# Patient Record
Sex: MALE | Race: WHITE | Employment: FULL TIME | URBAN - METROPOLITAN AREA
[De-identification: names, ages, dates, MRNs, and addresses within clinical notes are randomized per-mention and may not be internally consistent; named-entity substitution may affect disease eponyms.]

---

## 2017-05-31 ENCOUNTER — TELEPHONE (OUTPATIENT)
Dept: FAMILY MEDICINE CLINIC | Age: 42
End: 2017-05-31

## 2017-10-08 ENCOUNTER — HOSPITAL ENCOUNTER (EMERGENCY)
Age: 42
Discharge: HOME | End: 2017-10-08
Attending: EMERGENCY MEDICINE
Payer: COMMERCIAL

## 2017-10-08 VITALS
HEART RATE: 59 BPM | RESPIRATION RATE: 12 BRPM | TEMPERATURE: 98.5 F | OXYGEN SATURATION: 96 % | DIASTOLIC BLOOD PRESSURE: 99 MMHG | SYSTOLIC BLOOD PRESSURE: 156 MMHG

## 2017-10-08 DIAGNOSIS — L50.9 URTICARIA: ICD-10-CM

## 2017-10-08 DIAGNOSIS — R03.0 TRANSIENT ELEVATED BLOOD PRESSURE: ICD-10-CM

## 2017-10-08 DIAGNOSIS — L30.9 DERMATITIS: Primary | ICD-10-CM

## 2017-10-08 PROCEDURE — 99282 EMERGENCY DEPT VISIT SF MDM: CPT

## 2017-10-08 RX ORDER — HYDROXYZINE HYDROCHLORIDE 25 MG/1
50 TABLET, FILM COATED ORAL EVERY 4 HOURS PRN
Qty: 30 TABLET | Refills: 0 | Status: SHIPPED | OUTPATIENT
Start: 2017-10-08 | End: 2017-10-18

## 2017-10-08 RX ORDER — TRIAMCINOLONE ACETONIDE OINTMENT USP, 0.05% 0.5 MG/G
OINTMENT TOPICAL 2 TIMES DAILY
Qty: 30 G | Refills: 0 | Status: SHIPPED | OUTPATIENT
Start: 2017-10-08 | End: 2018-02-11

## 2017-10-08 ASSESSMENT — ENCOUNTER SYMPTOMS
RHINORRHEA: 0
EYE ITCHING: 0
VOMITING: 0
NAUSEA: 0
ABDOMINAL PAIN: 0
EYE DISCHARGE: 0
WHEEZING: 0
SHORTNESS OF BREATH: 0
DIARRHEA: 0
ABDOMINAL DISTENTION: 0
COUGH: 0

## 2017-10-08 ASSESSMENT — PAIN DESCRIPTION - LOCATION: LOCATION: ARM;BACK

## 2017-10-08 ASSESSMENT — PAIN DESCRIPTION - FREQUENCY: FREQUENCY: INTERMITTENT

## 2017-10-08 ASSESSMENT — PAIN DESCRIPTION - PROGRESSION: CLINICAL_PROGRESSION: GRADUALLY WORSENING

## 2017-10-08 ASSESSMENT — PAIN SCALES - GENERAL: PAINLEVEL_OUTOF10: 9

## 2017-10-08 ASSESSMENT — PAIN DESCRIPTION - DESCRIPTORS: DESCRIPTORS: BURNING;ITCHING

## 2017-10-08 ASSESSMENT — PAIN DESCRIPTION - ONSET: ONSET: ON-GOING

## 2017-10-08 ASSESSMENT — PAIN DESCRIPTION - ORIENTATION: ORIENTATION: RIGHT;LEFT;LOWER

## 2017-10-08 NOTE — ED PROVIDER NOTES
Ultrasound and MRI are read by the radiologist.  None  [] Visualized and interpreted by me   [] Radiologist's Wet Read Report Reviewed   [] Discussed with Radiologist.    LABS:   Labs Reviewed - No data to display    EMERGENCY DEPARTMENT COURSE:   Vitals:    Vitals:    10/08/17 0028   BP: (!) 156/99   Pulse: 59   Resp: 12   Temp: 98.5 °F (36.9 °C)   SpO2: 96%       1:54 AM     Patient was seen and evaluated in a timely fashion. His rashes from lower back and right elbow are chronic which can be chronic urticaria or atopic dermatitis. His rashes from left forearm is consistent with acute urticaria. I recommend use hydroxyzine and topical steroid. He is prescribed with hydroxyzine and triamcinolone cream. Followed by [facility] in one week. BP is elevated which should be rechecked by PCP during follow up visit. CRITICAL CARE:   None    CONSULTS:  None    PROCEDURES:  None    FINAL IMPRESSION      1. Dermatitis    2. Urticaria    3.  Transient elevated blood pressure          DISPOSITION/PLAN   Home    PATIENT REFERRED TO:  [facility]  [address]  [phone]  In 1 week        DISCHARGE MEDICATIONS:  New Prescriptions    HYDROXYZINE (ATARAX) 25 MG TABLET    Take 2 tablets by mouth every 4 hours as needed for Itching    TRIAMCINOLONE (KENALOG) 0.05 % OINT OINTMENT    Apply topically 2 times daily       (Please note that portions of this note were completed with a voice recognition program.  Efforts were made to edit the dictations but occasionally words are mis-transcribed.)    MD Susie Myrick MD  10/08/17 5495

## 2018-02-11 ENCOUNTER — HOSPITAL ENCOUNTER (EMERGENCY)
Age: 43
Discharge: HOME | End: 2018-02-11
Payer: COMMERCIAL

## 2018-02-11 VITALS
DIASTOLIC BLOOD PRESSURE: 90 MMHG | BODY MASS INDEX: 41.68 KG/M2 | TEMPERATURE: 98.5 F | OXYGEN SATURATION: 97 % | SYSTOLIC BLOOD PRESSURE: 154 MMHG | WEIGHT: 275 LBS | HEART RATE: 94 BPM | HEIGHT: 68 IN | RESPIRATION RATE: 18 BRPM

## 2018-02-11 DIAGNOSIS — B34.9 VIRAL ILLNESS: Primary | ICD-10-CM

## 2018-02-11 LAB
FLU A ANTIGEN: NEGATIVE
FLU B ANTIGEN: NEGATIVE
GROUP A STREP CULTURE, REFLEX: NEGATIVE
REFLEX THROAT C + S: NORMAL

## 2018-02-11 PROCEDURE — 87070 CULTURE OTHR SPECIMN AEROBIC: CPT

## 2018-02-11 PROCEDURE — 99283 EMERGENCY DEPT VISIT LOW MDM: CPT

## 2018-02-11 PROCEDURE — 87804 INFLUENZA ASSAY W/OPTIC: CPT

## 2018-02-11 PROCEDURE — 87880 STREP A ASSAY W/OPTIC: CPT

## 2018-02-11 RX ORDER — OSELTAMIVIR PHOSPHATE 75 MG/1
75 CAPSULE ORAL 2 TIMES DAILY
Qty: 10 CAPSULE | Refills: 0 | Status: SHIPPED | OUTPATIENT
Start: 2018-02-11 | End: 2018-02-16

## 2018-02-11 ASSESSMENT — ENCOUNTER SYMPTOMS
NAUSEA: 0
RHINORRHEA: 0
COUGH: 0
VOMITING: 1
SHORTNESS OF BREATH: 0
BACK PAIN: 0
DIARRHEA: 1
ABDOMINAL PAIN: 0
SORE THROAT: 0
EYE DISCHARGE: 0
EYE REDNESS: 0
WHEEZING: 0

## 2018-02-11 NOTE — ED PROVIDER NOTES
[facility]  eMERGENCY dEPARTMENT eNCOUnter          CHIEF COMPLAINT       Chief Complaint   Patient presents with    Generalized Body Aches    Fever       Nurses Notes reviewed and I agree except as noted in the HPI. HISTORY OF PRESENT ILLNESS    Annelise Herring is a 43 y.o. male who presents to the Emergency Department for the evaluation of fever and emesis. Patient states that he started having body aches at 2330 last night and had an episode of emesis. He also states that he has had diarrhea, fatigue, chills, and headache. Patient reports that his wife and children were sick recently and his wife was diagnosed with Influenza. He has been taking his wife's medication and Tylenol today with no improvement. Patient denies rhinorrhea, cough, or sore throat. All questions addressed and no further complaints or concerns at this time. The HPI was provided by the patient. REVIEW OF SYSTEMS     Review of Systems   Constitutional: Positive for chills, fatigue and fever. Negative for appetite change. HENT: Negative for congestion, ear pain, rhinorrhea and sore throat. Eyes: Negative for discharge, redness and visual disturbance. Respiratory: Negative for cough, shortness of breath and wheezing. Cardiovascular: Negative for chest pain, palpitations and leg swelling. Gastrointestinal: Positive for diarrhea and vomiting. Negative for abdominal pain and nausea. Genitourinary: Negative for decreased urine volume, difficulty urinating and dysuria. Musculoskeletal: Negative for arthralgias, back pain, joint swelling and neck pain. Skin: Negative for pallor and rash. Allergic/Immunologic: Negative for environmental allergies. Neurological: Negative for dizziness, syncope, weakness, light-headedness and headaches. Hematological: Negative for adenopathy. Psychiatric/Behavioral: Negative for agitation, confusion, dysphoric mood and suicidal ideas.  The patient is not [address] [place] [address]  [phone]          DISCHARGE MEDICATIONS:  Discharge Medication List as of 2/11/2018  6:43 PM      START taking these medications    Details   oseltamivir (TAMIFLU) 75 MG capsule Take 1 capsule by mouth 2 times daily for 5 days, Disp-10 capsule, R-0Print             (Please note that portions of this note were completed with a voice recognition program.  Efforts were made to edit the dictations but occasionally words are mis-transcribed.)    The patient was given an opportunity to see the Emergency Attending. The patient voiced understanding that I was a Mid-Level Provider and was in agreement with being seen independently by myself. Scribe:  Junior Kay 2/11/18 6:43 PM Scribing for and in the presence of Wai Sands CNP. Signed by: Vaishali Alas, 02/11/18 6:59 PM    Provider:  I personally performed the services described in the documentation, reviewed and edited the documentation which was dictated to the scribe in my presence, and it accurately records my words and actions.     Junior Kay 2/11/18 6:59 PM       Rigo Cheng CNP  02/13/18 0125

## 2018-02-13 LAB — THROAT/NOSE CULTURE: NORMAL

## 2018-05-04 ENCOUNTER — HOSPITAL ENCOUNTER (EMERGENCY)
Age: 43
Discharge: HOME | End: 2018-05-04
Payer: COMMERCIAL

## 2018-05-04 ENCOUNTER — HOSPITAL ENCOUNTER (EMERGENCY)
Dept: GENERAL RADIOLOGY | Age: 43
Discharge: HOME | End: 2018-05-04
Payer: COMMERCIAL

## 2018-05-04 VITALS
HEART RATE: 75 BPM | OXYGEN SATURATION: 95 % | RESPIRATION RATE: 18 BRPM | DIASTOLIC BLOOD PRESSURE: 79 MMHG | WEIGHT: 281 LBS | BODY MASS INDEX: 43.04 KG/M2 | SYSTOLIC BLOOD PRESSURE: 152 MMHG | TEMPERATURE: 97.7 F

## 2018-05-04 DIAGNOSIS — J06.9 ACUTE UPPER RESPIRATORY INFECTION: Primary | ICD-10-CM

## 2018-05-04 PROCEDURE — 99213 OFFICE O/P EST LOW 20 MIN: CPT | Performed by: NURSE PRACTITIONER

## 2018-05-04 PROCEDURE — 6360000002 HC RX W HCPCS: Performed by: NURSE PRACTITIONER

## 2018-05-04 PROCEDURE — 94640 AIRWAY INHALATION TREATMENT: CPT

## 2018-05-04 PROCEDURE — 99214 OFFICE O/P EST MOD 30 MIN: CPT

## 2018-05-04 PROCEDURE — 71046 X-RAY EXAM CHEST 2 VIEWS: CPT

## 2018-05-04 RX ORDER — CETIRIZINE HYDROCHLORIDE 10 MG/1
10 TABLET ORAL DAILY
COMMUNITY
End: 2020-01-09

## 2018-05-04 RX ORDER — ALBUTEROL SULFATE 2.5 MG/3ML
2.5 SOLUTION RESPIRATORY (INHALATION) ONCE
Status: COMPLETED | OUTPATIENT
Start: 2018-05-04 | End: 2018-05-04

## 2018-05-04 RX ORDER — DOXYCYCLINE HYCLATE 100 MG
100 TABLET ORAL 2 TIMES DAILY
Qty: 20 TABLET | Refills: 0 | Status: SHIPPED | OUTPATIENT
Start: 2018-05-04 | End: 2018-05-14

## 2018-05-04 RX ORDER — ALBUTEROL SULFATE 90 UG/1
2 AEROSOL, METERED RESPIRATORY (INHALATION) EVERY 6 HOURS PRN
Qty: 1 INHALER | Refills: 0 | Status: SHIPPED | OUTPATIENT
Start: 2018-05-04 | End: 2019-09-12

## 2018-05-04 RX ORDER — PREDNISONE 10 MG/1
40 TABLET ORAL DAILY
Qty: 20 TABLET | Refills: 0 | Status: SHIPPED | OUTPATIENT
Start: 2018-05-04 | End: 2018-05-09

## 2018-05-04 RX ADMIN — ALBUTEROL SULFATE 2.5 MG: 2.5 SOLUTION RESPIRATORY (INHALATION) at 14:47

## 2018-05-04 ASSESSMENT — ENCOUNTER SYMPTOMS
NAUSEA: 0
SINUS PRESSURE: 1
COUGH: 1
SORE THROAT: 0
WHEEZING: 0
ABDOMINAL PAIN: 0
SHORTNESS OF BREATH: 1
CHEST TIGHTNESS: 0
VOMITING: 0

## 2018-05-04 ASSESSMENT — PAIN DESCRIPTION - FREQUENCY: FREQUENCY: INTERMITTENT

## 2018-05-04 ASSESSMENT — PAIN DESCRIPTION - LOCATION: LOCATION: THROAT

## 2018-05-04 ASSESSMENT — PAIN SCALES - GENERAL: PAINLEVEL_OUTOF10: 5

## 2018-05-04 ASSESSMENT — PAIN DESCRIPTION - PAIN TYPE: TYPE: ACUTE PAIN

## 2018-05-04 ASSESSMENT — PAIN DESCRIPTION - DESCRIPTORS: DESCRIPTORS: ACHING

## 2018-06-26 ENCOUNTER — HOSPITAL ENCOUNTER (EMERGENCY)
Dept: GENERAL RADIOLOGY | Age: 43
Discharge: HOME | End: 2018-06-26
Payer: COMMERCIAL

## 2018-06-26 ENCOUNTER — HOSPITAL ENCOUNTER (EMERGENCY)
Age: 43
Discharge: HOME | End: 2018-06-26
Payer: COMMERCIAL

## 2018-06-26 VITALS
WEIGHT: 284.25 LBS | DIASTOLIC BLOOD PRESSURE: 79 MMHG | SYSTOLIC BLOOD PRESSURE: 142 MMHG | BODY MASS INDEX: 43.08 KG/M2 | HEIGHT: 68 IN | RESPIRATION RATE: 18 BRPM | TEMPERATURE: 98.7 F | OXYGEN SATURATION: 96 % | HEART RATE: 63 BPM

## 2018-06-26 DIAGNOSIS — R59.0 LYMPHADENOPATHY OF HEAD AND NECK REGION: Primary | ICD-10-CM

## 2018-06-26 LAB
GROUP A STREP CULTURE, REFLEX: NEGATIVE
REFLEX THROAT C + S: NORMAL

## 2018-06-26 PROCEDURE — 70360 X-RAY EXAM OF NECK: CPT

## 2018-06-26 PROCEDURE — 99213 OFFICE O/P EST LOW 20 MIN: CPT | Performed by: NURSE PRACTITIONER

## 2018-06-26 PROCEDURE — 99215 OFFICE O/P EST HI 40 MIN: CPT

## 2018-06-26 PROCEDURE — 87070 CULTURE OTHR SPECIMN AEROBIC: CPT

## 2018-06-26 RX ORDER — AMOXICILLIN AND CLAVULANATE POTASSIUM 875; 125 MG/1; MG/1
1 TABLET, FILM COATED ORAL 2 TIMES DAILY
Qty: 20 TABLET | Refills: 0 | Status: SHIPPED | OUTPATIENT
Start: 2018-06-26 | End: 2018-07-06

## 2018-06-26 ASSESSMENT — ENCOUNTER SYMPTOMS
CHEST TIGHTNESS: 0
SHORTNESS OF BREATH: 0
ABDOMINAL PAIN: 0
DIARRHEA: 0
VOMITING: 0
NAUSEA: 0

## 2018-06-28 LAB — THROAT/NOSE CULTURE: NORMAL

## 2018-06-29 ENCOUNTER — TELEPHONE (OUTPATIENT)
Dept: ENT CLINIC | Age: 43
End: 2018-06-29

## 2018-06-29 NOTE — TELEPHONE ENCOUNTER
Patient was referred to the office for lymphadenopathy. Please review chart. How soon does patient need to be seen?

## 2018-07-05 NOTE — TELEPHONE ENCOUNTER
Spoke with my manager and notified [facility] urgent care, spoke with Brian Ferraro, letting them know we have attempted to reach patient 4 times for an appointment and patient has not gotten back with us. Faxed over the telephone encounter to Brian Ferraro @ [phone] so she can show Jazmine Carver NP who referred Julio César Alford to us. Will close referral at this time.

## 2019-02-27 ENCOUNTER — HOSPITAL ENCOUNTER (EMERGENCY)
Age: 44
Discharge: HOME | End: 2019-02-27
Payer: COMMERCIAL

## 2019-02-27 VITALS
RESPIRATION RATE: 12 BRPM | WEIGHT: 255 LBS | BODY MASS INDEX: 39.06 KG/M2 | OXYGEN SATURATION: 97 % | TEMPERATURE: 97.8 F | SYSTOLIC BLOOD PRESSURE: 149 MMHG | DIASTOLIC BLOOD PRESSURE: 71 MMHG | HEART RATE: 60 BPM

## 2019-02-27 DIAGNOSIS — B34.9 VIRAL ILLNESS: Primary | ICD-10-CM

## 2019-02-27 PROCEDURE — 99212 OFFICE O/P EST SF 10 MIN: CPT

## 2019-02-27 PROCEDURE — 99213 OFFICE O/P EST LOW 20 MIN: CPT | Performed by: NURSE PRACTITIONER

## 2019-02-27 ASSESSMENT — ENCOUNTER SYMPTOMS
NAUSEA: 0
SINUS CONGESTION: 1
RHINORRHEA: 0
VOMITING: 0
SINUS PRESSURE: 1
SORE THROAT: 1
ABDOMINAL PAIN: 0
COUGH: 0
SHORTNESS OF BREATH: 0

## 2019-02-28 ENCOUNTER — CARE COORDINATION (OUTPATIENT)
Dept: CASE MANAGEMENT | Age: 44
End: 2019-02-28

## 2019-03-01 ENCOUNTER — CARE COORDINATION (OUTPATIENT)
Dept: CASE MANAGEMENT | Age: 44
End: 2019-03-01

## 2019-08-04 ENCOUNTER — HOSPITAL ENCOUNTER (EMERGENCY)
Age: 44
Discharge: HOME | End: 2019-08-04
Payer: COMMERCIAL

## 2019-08-04 VITALS
HEART RATE: 58 BPM | WEIGHT: 260 LBS | BODY MASS INDEX: 39.83 KG/M2 | TEMPERATURE: 98.5 F | RESPIRATION RATE: 16 BRPM | OXYGEN SATURATION: 96 % | SYSTOLIC BLOOD PRESSURE: 136 MMHG | DIASTOLIC BLOOD PRESSURE: 79 MMHG

## 2019-08-04 DIAGNOSIS — M54.2 NECK PAIN: Primary | ICD-10-CM

## 2019-08-04 PROCEDURE — 99213 OFFICE O/P EST LOW 20 MIN: CPT

## 2019-08-04 PROCEDURE — 99214 OFFICE O/P EST MOD 30 MIN: CPT | Performed by: NURSE PRACTITIONER

## 2019-08-04 RX ORDER — CYCLOBENZAPRINE HCL 5 MG
5 TABLET ORAL 3 TIMES DAILY PRN
Qty: 30 TABLET | Refills: 0 | Status: SHIPPED | OUTPATIENT
Start: 2019-08-04 | End: 2019-08-14

## 2019-08-04 RX ORDER — METHYLPREDNISOLONE 4 MG/1
TABLET ORAL
Qty: 1 KIT | Refills: 0 | Status: SHIPPED | OUTPATIENT
Start: 2019-08-04 | End: 2019-09-12

## 2019-08-04 RX ORDER — ONDANSETRON 4 MG/1
4 TABLET, FILM COATED ORAL EVERY 8 HOURS PRN
Qty: 15 TABLET | Refills: 0 | Status: SHIPPED | OUTPATIENT
Start: 2019-08-04 | End: 2019-08-09

## 2019-08-04 ASSESSMENT — PAIN SCALES - GENERAL: PAINLEVEL_OUTOF10: 5

## 2019-08-04 ASSESSMENT — ENCOUNTER SYMPTOMS
COUGH: 0
ALLERGIC/IMMUNOLOGIC NEGATIVE: 1
EYE REDNESS: 0
GASTROINTESTINAL NEGATIVE: 1
CHEST TIGHTNESS: 0

## 2019-08-04 ASSESSMENT — PAIN DESCRIPTION - DESCRIPTORS: DESCRIPTORS: ACHING;SHARP

## 2019-08-04 ASSESSMENT — PAIN DESCRIPTION - FREQUENCY: FREQUENCY: INTERMITTENT

## 2019-08-04 ASSESSMENT — PAIN DESCRIPTION - LOCATION: LOCATION: NECK

## 2019-08-04 ASSESSMENT — PAIN DESCRIPTION - ORIENTATION: ORIENTATION: RIGHT

## 2019-08-04 ASSESSMENT — PAIN - FUNCTIONAL ASSESSMENT: PAIN_FUNCTIONAL_ASSESSMENT: PREVENTS OR INTERFERES SOME ACTIVE ACTIVITIES AND ADLS

## 2019-08-04 ASSESSMENT — PAIN DESCRIPTION - PAIN TYPE: TYPE: ACUTE PAIN

## 2019-08-04 NOTE — ED PROVIDER NOTES
Patient is is allergic to ibuprofen. FAMILY HISTORY     Patient'sfamily history includes COPD in his mother. SOCIAL HISTORY     Patient  reports that he has never smoked. He has never used smokeless tobacco. He reports that he does not drink alcohol or use drugs. PHYSICAL EXAM     ED TRIAGE VITALS  BP: 136/79, Temp: 98.5 °F (36.9 °C), Pulse: 58, Resp: 16, SpO2: 96 %  Physical Exam   Constitutional: He is oriented to person, place, and time. He appears well-developed and well-nourished. HENT:   Head: Normocephalic. Right Ear: External ear normal.   Left Ear: External ear normal.   Eyes: Conjunctivae are normal.   Neck: Normal range of motion. Neck supple. Cardiovascular: Normal rate and regular rhythm. Pulmonary/Chest: Effort normal and breath sounds normal. No respiratory distress. Abdominal: Soft. Bowel sounds are normal.   Musculoskeletal: Normal range of motion. He exhibits no edema. Cervical back: He exhibits tenderness and spasm. He exhibits no bony tenderness, no swelling, no pain and normal pulse. Back:    Lymphadenopathy:     He has no cervical adenopathy. Neurological: He is alert and oriented to person, place, and time. He displays normal reflexes. Coordination normal.   Skin: Skin is warm and dry. Capillary refill takes less than 2 seconds. Psychiatric: He has a normal mood and affect. His behavior is normal.   Nursing note and vitals reviewed. DIAGNOSTIC RESULTS   Labs: No results found for this visit on 08/04/19. IMAGING:  No orders to display     URGENT CARE COURSE:     Vitals:    08/04/19 1716   BP: 136/79   Pulse: 58   Resp: 16   Temp: 98.5 °F (36.9 °C)   TempSrc: Temporal   SpO2: 96%   Weight: 260 lb (117.9 kg)       Medications - No data to display  PROCEDURES:  None  FINALIMPRESSION      1. Neck pain      Instructions given on measures to relieve his neck pain, decrease vertigo and limit nausea.    DISPOSITION/PLAN   DISPOSITION      PATIENT REFERRED

## 2019-09-12 ENCOUNTER — HOSPITAL ENCOUNTER (EMERGENCY)
Age: 44
Discharge: HOME | End: 2019-09-12
Payer: COMMERCIAL

## 2019-09-12 ENCOUNTER — APPOINTMENT (OUTPATIENT)
Dept: GENERAL RADIOLOGY | Age: 44
End: 2019-09-12
Payer: COMMERCIAL

## 2019-09-12 ENCOUNTER — HOSPITAL ENCOUNTER (EMERGENCY)
Dept: GENERAL RADIOLOGY | Age: 44
End: 2019-09-12
Payer: COMMERCIAL

## 2019-09-12 VITALS
TEMPERATURE: 98.6 F | SYSTOLIC BLOOD PRESSURE: 153 MMHG | RESPIRATION RATE: 16 BRPM | WEIGHT: 255 LBS | DIASTOLIC BLOOD PRESSURE: 86 MMHG | HEART RATE: 95 BPM | OXYGEN SATURATION: 92 % | BODY MASS INDEX: 39.06 KG/M2

## 2019-09-12 DIAGNOSIS — J06.9 UPPER RESPIRATORY TRACT INFECTION, UNSPECIFIED TYPE: ICD-10-CM

## 2019-09-12 DIAGNOSIS — J40 BRONCHITIS: Primary | ICD-10-CM

## 2019-09-12 DIAGNOSIS — R06.02 SHORTNESS OF BREATH: ICD-10-CM

## 2019-09-12 LAB
ANION GAP SERPL CALCULATED.3IONS-SCNC: 9 MEQ/L (ref 8–16)
BASOPHILS # BLD: 0.6 %
BASOPHILS ABSOLUTE: 0.1 THOU/MM3 (ref 0–0.1)
BUN BLDV-MCNC: 10 MG/DL (ref 7–22)
CALCIUM SERPL-MCNC: 9.5 MG/DL (ref 8.5–10.5)
CHLORIDE BLD-SCNC: 104 MEQ/L (ref 98–111)
CO2: 27 MEQ/L (ref 23–33)
CREAT SERPL-MCNC: 0.8 MG/DL (ref 0.4–1.2)
EKG ATRIAL RATE: 102 BPM
EKG P AXIS: 81 DEGREES
EKG P-R INTERVAL: 150 MS
EKG Q-T INTERVAL: 350 MS
EKG QRS DURATION: 96 MS
EKG QTC CALCULATION (BAZETT): 456 MS
EKG R AXIS: 82 DEGREES
EKG T AXIS: 49 DEGREES
EKG VENTRICULAR RATE: 102 BPM
EOSINOPHIL # BLD: 4.9 %
EOSINOPHILS ABSOLUTE: 0.8 THOU/MM3 (ref 0–0.4)
ERYTHROCYTE [DISTWIDTH] IN BLOOD BY AUTOMATED COUNT: 13.1 % (ref 11.5–14.5)
ERYTHROCYTE [DISTWIDTH] IN BLOOD BY AUTOMATED COUNT: 43.4 FL (ref 35–45)
GFR SERPL CREATININE-BSD FRML MDRD: > 90 ML/MIN/1.73M2
GLUCOSE BLD-MCNC: 130 MG/DL (ref 70–108)
HCT VFR BLD CALC: 44.9 % (ref 42–52)
HEMOGLOBIN: 15 GM/DL (ref 14–18)
IMMATURE GRANS (ABS): 0.06 THOU/MM3 (ref 0–0.07)
IMMATURE GRANULOCYTES: 0 %
LYMPHOCYTES # BLD: 8.5 %
LYMPHOCYTES ABSOLUTE: 1.5 THOU/MM3 (ref 1–4.8)
MCH RBC QN AUTO: 30.4 PG (ref 26–33)
MCHC RBC AUTO-ENTMCNC: 33.4 GM/DL (ref 32.2–35.5)
MCV RBC AUTO: 90.9 FL (ref 80–94)
MONOCYTES # BLD: 7.5 %
MONOCYTES ABSOLUTE: 1.3 THOU/MM3 (ref 0.4–1.3)
NUCLEATED RED BLOOD CELLS: 0 /100 WBC
PLATELET # BLD: 248 THOU/MM3 (ref 130–400)
PMV BLD AUTO: 9 FL (ref 9.4–12.4)
POTASSIUM REFLEX MAGNESIUM: 4.5 MEQ/L (ref 3.5–5.2)
RBC # BLD: 4.94 MILL/MM3 (ref 4.7–6.1)
SEG NEUTROPHILS: 78.1 %
SEGMENTED NEUTROPHILS ABSOLUTE COUNT: 13.4 THOU/MM3 (ref 1.8–7.7)
SODIUM BLD-SCNC: 140 MEQ/L (ref 135–145)
WBC # BLD: 17.1 THOU/MM3 (ref 4.8–10.8)

## 2019-09-12 PROCEDURE — 94761 N-INVAS EAR/PLS OXIMETRY MLT: CPT

## 2019-09-12 PROCEDURE — 2700000000 HC OXYGEN THERAPY PER DAY

## 2019-09-12 PROCEDURE — 94640 AIRWAY INHALATION TREATMENT: CPT

## 2019-09-12 PROCEDURE — 2709999900 HC NON-CHARGEABLE SUPPLY

## 2019-09-12 PROCEDURE — 6370000000 HC RX 637 (ALT 250 FOR IP): Performed by: NURSE PRACTITIONER

## 2019-09-12 PROCEDURE — 85025 COMPLETE CBC W/AUTO DIFF WBC: CPT

## 2019-09-12 PROCEDURE — 71046 X-RAY EXAM CHEST 2 VIEWS: CPT

## 2019-09-12 PROCEDURE — 99285 EMERGENCY DEPT VISIT HI MDM: CPT

## 2019-09-12 PROCEDURE — 80048 BASIC METABOLIC PNL TOTAL CA: CPT

## 2019-09-12 PROCEDURE — 6360000002 HC RX W HCPCS: Performed by: NURSE PRACTITIONER

## 2019-09-12 PROCEDURE — 36415 COLL VENOUS BLD VENIPUNCTURE: CPT

## 2019-09-12 PROCEDURE — 2580000003 HC RX 258: Performed by: NURSE PRACTITIONER

## 2019-09-12 PROCEDURE — 99215 OFFICE O/P EST HI 40 MIN: CPT

## 2019-09-12 RX ORDER — IPRATROPIUM BROMIDE AND ALBUTEROL SULFATE 2.5; .5 MG/3ML; MG/3ML
1 SOLUTION RESPIRATORY (INHALATION) ONCE
Status: COMPLETED | OUTPATIENT
Start: 2019-09-12 | End: 2019-09-12

## 2019-09-12 RX ORDER — PREDNISONE 10 MG/1
TABLET ORAL
Qty: 20 TABLET | Refills: 0 | Status: SHIPPED | OUTPATIENT
Start: 2019-09-12 | End: 2019-09-22

## 2019-09-12 RX ORDER — AZITHROMYCIN 500 MG/1
500 TABLET, FILM COATED ORAL DAILY
Qty: 3 TABLET | Refills: 0 | Status: SHIPPED | OUTPATIENT
Start: 2019-09-12 | End: 2019-09-15

## 2019-09-12 RX ORDER — 0.9 % SODIUM CHLORIDE 0.9 %
1000 INTRAVENOUS SOLUTION INTRAVENOUS ONCE
Status: COMPLETED | OUTPATIENT
Start: 2019-09-12 | End: 2019-09-12

## 2019-09-12 RX ORDER — ALBUTEROL SULFATE 2.5 MG/3ML
2.5 SOLUTION RESPIRATORY (INHALATION) ONCE
Status: COMPLETED | OUTPATIENT
Start: 2019-09-12 | End: 2019-09-12

## 2019-09-12 RX ADMIN — IPRATROPIUM BROMIDE AND ALBUTEROL SULFATE 1 AMPULE: .5; 3 SOLUTION RESPIRATORY (INHALATION) at 10:33

## 2019-09-12 RX ADMIN — ALBUTEROL SULFATE 2.5 MG: 2.5 SOLUTION RESPIRATORY (INHALATION) at 08:43

## 2019-09-12 RX ADMIN — IPRATROPIUM BROMIDE AND ALBUTEROL SULFATE 1 AMPULE: .5; 3 SOLUTION RESPIRATORY (INHALATION) at 10:23

## 2019-09-12 RX ADMIN — SODIUM CHLORIDE 1000 ML: 9 INJECTION, SOLUTION INTRAVENOUS at 09:34

## 2019-09-12 ASSESSMENT — ENCOUNTER SYMPTOMS
CHEST TIGHTNESS: 1
SORE THROAT: 1
APNEA: 0
COLOR CHANGE: 0
PHOTOPHOBIA: 0
STRIDOR: 0
SINUS PAIN: 1
CONSTIPATION: 0
BACK PAIN: 0
CHEST TIGHTNESS: 0
RHINORRHEA: 0
DIARRHEA: 0
SORE THROAT: 0
COUGH: 1
TROUBLE SWALLOWING: 0
SINUS PRESSURE: 1
WHEEZING: 1
NAUSEA: 0
VOMITING: 0
SHORTNESS OF BREATH: 1
ABDOMINAL PAIN: 0

## 2019-09-12 ASSESSMENT — PAIN DESCRIPTION - PAIN TYPE: TYPE: ACUTE PAIN

## 2019-09-12 ASSESSMENT — PAIN SCALES - GENERAL: PAINLEVEL_OUTOF10: 9

## 2019-09-12 ASSESSMENT — PAIN - FUNCTIONAL ASSESSMENT: PAIN_FUNCTIONAL_ASSESSMENT: PREVENTS OR INTERFERES WITH MANY ACTIVE NOT PASSIVE ACTIVITIES

## 2019-09-12 ASSESSMENT — PAIN DESCRIPTION - DESCRIPTORS: DESCRIPTORS: ACHING;HEAVINESS

## 2019-09-12 ASSESSMENT — PAIN DESCRIPTION - LOCATION: LOCATION: CHEST

## 2019-09-12 ASSESSMENT — PAIN DESCRIPTION - FREQUENCY: FREQUENCY: INTERMITTENT

## 2019-09-12 NOTE — ED PROVIDER NOTES
Negative for color change and wound. Allergic/Immunologic: Negative for environmental allergies and immunocompromised state. Neurological: Negative for dizziness, speech difficulty, weakness, light-headedness, numbness and headaches. PAST MEDICAL HISTORY    has a past medical history of Bell palsy and Vertigo. SURGICAL HISTORY      has no past surgical history on file. CURRENT MEDICATIONS       Discharge Medication List as of 2019 11:26 AM      CONTINUE these medications which have NOT CHANGED    Details   diphenhydrAMINE-PE-APAP (MUCINEX FAST-MAX COLD FLU NGHT) 12.5-5-325 MG/10ML LIQD Take by mouthHistorical Med      Pseudoeph-Doxylamine-DM-APAP (NYQUIL MULTI-SYMPTOM PO) Take by mouthHistorical Med      cetirizine (ZYRTEC) 10 MG tablet Take 10 mg by mouth dailyHistorical Med      Fluticasone Propionate (FLONASE NA) by Nasal routeHistorical Med             ALLERGIES     is allergic to ibuprofen. FAMILY HISTORY     He indicated that his mother is . He indicated that his father is alive. family history includes COPD in his mother. SOCIAL HISTORY      reports that he has never smoked. He has never used smokeless tobacco. He reports that he does not drink alcohol or use drugs. PHYSICAL EXAM     INITIAL VITALS:  weight is 255 lb (115.7 kg). His oral temperature is 98.6 °F (37 °C). His blood pressure is 153/86 (abnormal) and his pulse is 95. His respiration is 16 and oxygen saturation is 92%. Physical Exam   Constitutional: He is oriented to person, place, and time. He appears well-developed and well-nourished. HENT:   Head: Normocephalic and atraumatic. Right Ear: External ear normal.   Left Ear: External ear normal.   Nose: Nose normal.   Mouth/Throat: Oropharynx is clear and moist.   Eyes: Pupils are equal, round, and reactive to light. EOM are normal. Right eye exhibits no discharge. Left eye exhibits no discharge. Neck: Normal range of motion.    Cardiovascular: Normal Take 1 tablet by mouth daily for 3 days, Disp-3 tablet, R-0Print      albuterol sulfate (PROAIR RESPICLICK) 038 (90 Base) MCG/ACT aerosol powder inhalation Inhale 2 puffs into the lungs every 6 hours as needed for Wheezing or Shortness of Breath, Disp-1 Inhaler, R-0Print      predniSONE (DELTASONE) 10 MG tablet Take 4 tablets by mouth once daily for 5 days, Disp-20 tablet, R-0Print             (Please note that portions of this note were completed with a voice recognition program.  Efforts were made to edit the dictations but occasionally words are mis-transcribed.)    The patient was given an opportunity to see the Emergency Attending. The patient voiced understanding that I was a Mid-LevelProvider and was in agreement with being seen independently by myself.           ARTURO Castro - NINI  09/12/19 [id number]

## 2019-09-12 NOTE — ED PROVIDER NOTES
note were completed with a voice recognition program. Efforts were made to edit the dictations but occasionally words are mis-transcribed.)          Hodges Lesch, ARTURO - CNP  09/12/19 2403

## 2019-09-13 ENCOUNTER — CARE COORDINATION (OUTPATIENT)
Dept: CASE MANAGEMENT | Age: 44
End: 2019-09-13

## 2019-09-13 NOTE — CARE COORDINATION
[facility] ED Follow Up Call    2019    Patient: Niranjan Vega Patient : 1975   MRN: [id number]      Reason for ED visit:  Bronchitis / URI    Attempted to contact patient for Care Transition ED follow up and to establish PCP. Unable to leave message for patient to return CTN call. Voicemail box is not set up. No HIPAA consent on file. CTN will continue to follow. No future appointments.     Micah Nelson RN  Care Transition Nurse  [phone]  21

## 2019-09-20 ENCOUNTER — TELEPHONE (OUTPATIENT)
Dept: ADMINISTRATIVE | Age: 44
End: 2019-09-20

## 2019-09-29 ENCOUNTER — HOSPITAL ENCOUNTER (EMERGENCY)
Age: 44
Discharge: HOME | End: 2019-09-29
Payer: COMMERCIAL

## 2019-09-29 VITALS
TEMPERATURE: 97.1 F | OXYGEN SATURATION: 98 % | HEIGHT: 68 IN | HEART RATE: 68 BPM | RESPIRATION RATE: 16 BRPM | WEIGHT: 260 LBS | BODY MASS INDEX: 39.4 KG/M2 | DIASTOLIC BLOOD PRESSURE: 84 MMHG | SYSTOLIC BLOOD PRESSURE: 164 MMHG

## 2019-09-29 DIAGNOSIS — S29.019A THORACIC MYOFASCIAL STRAIN, INITIAL ENCOUNTER: Primary | ICD-10-CM

## 2019-09-29 DIAGNOSIS — M62.838 SPASM OF MUSCLE: ICD-10-CM

## 2019-09-29 PROCEDURE — 99212 OFFICE O/P EST SF 10 MIN: CPT

## 2019-09-29 PROCEDURE — 99213 OFFICE O/P EST LOW 20 MIN: CPT | Performed by: NURSE PRACTITIONER

## 2019-09-29 RX ORDER — CYCLOBENZAPRINE HCL 10 MG
10 TABLET ORAL 3 TIMES DAILY PRN
Qty: 15 TABLET | Refills: 0 | Status: SHIPPED | OUTPATIENT
Start: 2019-09-29 | End: 2019-10-09

## 2019-09-29 RX ORDER — PREDNISONE 20 MG/1
40 TABLET ORAL DAILY
Qty: 10 TABLET | Refills: 0 | Status: SHIPPED | OUTPATIENT
Start: 2019-09-29 | End: 2019-10-04

## 2019-09-29 RX ORDER — CYCLOBENZAPRINE HCL 5 MG
5 TABLET ORAL 3 TIMES DAILY PRN
COMMUNITY
End: 2019-09-29

## 2019-09-29 ASSESSMENT — PAIN DESCRIPTION - LOCATION: LOCATION: NECK

## 2019-09-29 ASSESSMENT — PAIN DESCRIPTION - ORIENTATION: ORIENTATION: RIGHT

## 2019-09-29 ASSESSMENT — ENCOUNTER SYMPTOMS
VOMITING: 0
NAUSEA: 0

## 2019-09-29 ASSESSMENT — PAIN SCALES - GENERAL: PAINLEVEL_OUTOF10: 9

## 2019-09-29 ASSESSMENT — PAIN DESCRIPTION - FREQUENCY: FREQUENCY: INTERMITTENT

## 2019-09-29 ASSESSMENT — PAIN - FUNCTIONAL ASSESSMENT: PAIN_FUNCTIONAL_ASSESSMENT: ACTIVITIES ARE NOT PREVENTED

## 2020-01-09 ENCOUNTER — HOSPITAL ENCOUNTER (EMERGENCY)
Age: 45
Discharge: HOME | End: 2020-01-09
Payer: COMMERCIAL

## 2020-01-09 VITALS
BODY MASS INDEX: 37.89 KG/M2 | WEIGHT: 250 LBS | DIASTOLIC BLOOD PRESSURE: 81 MMHG | RESPIRATION RATE: 16 BRPM | SYSTOLIC BLOOD PRESSURE: 144 MMHG | TEMPERATURE: 98.4 F | HEIGHT: 68 IN | OXYGEN SATURATION: 95 % | HEART RATE: 74 BPM

## 2020-01-09 LAB
FLU A ANTIGEN: NEGATIVE
FLU B ANTIGEN: POSITIVE

## 2020-01-09 PROCEDURE — 99213 OFFICE O/P EST LOW 20 MIN: CPT | Performed by: NURSE PRACTITIONER

## 2020-01-09 PROCEDURE — 87804 INFLUENZA ASSAY W/OPTIC: CPT

## 2020-01-09 PROCEDURE — 99213 OFFICE O/P EST LOW 20 MIN: CPT

## 2020-01-09 RX ORDER — OSELTAMIVIR PHOSPHATE 75 MG/1
75 CAPSULE ORAL 2 TIMES DAILY
Qty: 10 CAPSULE | Refills: 0 | Status: SHIPPED | OUTPATIENT
Start: 2020-01-09 | End: 2020-01-14

## 2020-01-09 ASSESSMENT — ENCOUNTER SYMPTOMS
WHEEZING: 0
COUGH: 1
SINUS PAIN: 0
SINUS PRESSURE: 0
CHEST TIGHTNESS: 0
RHINORRHEA: 1
TROUBLE SWALLOWING: 0
DIARRHEA: 0
SORE THROAT: 0
EYE DISCHARGE: 0
NAUSEA: 0
VOMITING: 0
SHORTNESS OF BREATH: 0
EYE REDNESS: 0

## 2020-01-09 NOTE — LETTER
[facility]  [address] [place] [address]  Phone: [phone]               January 9, 2020    Patient: Shine Beltran   YOB: 1975   Date of Visit: 1/9/2020       To Whom It May Concern:    Abdirashid Anguiano was seen and treated in our emergency department on 1/9/2020. He may return to work on 1/11/2020.       Sincerely,       ARTURO Lanier CNP         Signature:__________________________________

## 2020-01-09 NOTE — ED PROVIDER NOTES
[address]       Chief Complaint   Patient presents with    Nasal Congestion    Dizziness    Fatigue    Cough       Nurses Notes reviewed and I agree except as noted in the HPI. HISTORY OF PRESENT ILLNESS   Shine Beltran is a 40 y.o. male who presents with complaints of cough. Onset 2 days ago, unchanged. Cough is intermittent, dry. Associated fever, nasal congestion, and body aches. Fever subjective, complains of chills/sweats. Denies wheezing, chest pain, shortness of breath. Denies sinus pain, pressure, or headache. No recent travel. No exposure to similar symptoms. No improvement with over-the-counter medicine. REVIEW OF SYSTEMS     Review of Systems   Constitutional: Positive for chills, diaphoresis, fatigue and fever. HENT: Positive for congestion, postnasal drip and rhinorrhea. Negative for ear pain, sinus pressure, sinus pain, sore throat and trouble swallowing. Eyes: Negative for discharge and redness. Respiratory: Positive for cough. Negative for chest tightness, shortness of breath and wheezing. Cardiovascular: Negative for chest pain. Gastrointestinal: Negative for diarrhea, nausea and vomiting. Musculoskeletal: Negative for neck pain and neck stiffness. Skin: Negative for rash. Neurological: Positive for dizziness (Resolved). Negative for headaches. Hematological: Negative for adenopathy. Psychiatric/Behavioral: Negative for sleep disturbance. PAST MEDICAL HISTORY         Diagnosis Date    Bell palsy     Vertigo        SURGICAL HISTORY     Patient  has no past surgical history on file. CURRENT MEDICATIONS       Previous Medications    ALBUTEROL SULFATE (PROAIR RESPICLICK) 030 (90 BASE) MCG/ACT AEROSOL POWDER INHALATION    Inhale 2 puffs into the lungs every 6 hours as needed for Wheezing or Shortness of Breath       ALLERGIES     Patient is is allergic to ibuprofen.     FAMILY HISTORY

## 2020-03-27 ENCOUNTER — TELEPHONE (OUTPATIENT)
Dept: FAMILY MEDICINE CLINIC | Age: 45
End: 2020-03-27

## 2020-03-27 NOTE — TELEPHONE ENCOUNTER
Future Appointments   Date Time Provider Malena Juan   3/30/2020  2:20 PM Linda Renteria, [facility] - [facility]   3/31/2020 12:40 PM Linda Renteria [facility] [address]       Mychart set up VV explained and voiced understanding

## 2020-03-29 NOTE — PROGRESS NOTES
pencil erasure  No pain  No redness  No fevers, chills or night sweats  Is mobile  Moves it around and doesn't hurt. No tongue pain or lesion        -Depressed Mood:    HPI:  Going on the last 2 to 3 years  Getting worse over time    Depressed Mood? yes -   Anhedonia? yes -   Appetite changes? yes -   Sleep disturbances? yes -   Feelings of guilt? yes -   Decreased energy? yes -   Impaired concentration? yes -   Substance abuse? no    Suicidal/Homicidal Ideation? no        -Vertigo:  Has had recurrent vertigo since age 20  Comes and goes  Comes on its own, or triggered by certain movements. Pt thinks has had MRI in the past  Initially started with a vestibular neuritis  Hasn't had much of a w/u in 15+ years  No better, no worse over time  He deals with it and controls it  Declines further eval or w/u at this time  No tinnitus  No hearing loss.    Did see ENT years and years ago  Again, refuses further eval at this       Age/Gender Health Maintenance    Lipid - discuss next visit  DM Screen - discuss next visit  Colon Cancer Screening - discuss next visit, fam hx  Lung Cancer Screening (Age 54 to [de-identified] with 27 pack year hx, current smoker or quit within past 13 years) - age 54 if meets criteria    Tetanus - discuss next visit  Influenza Vaccine - Candidate FALL 2020  Pneumonia Vaccine - age 72  Zoster - age 48     PSA testing discussion - discuss when needed next visit  AAA Screening - age 72 if smoked    Falls screening - n/a      Current Outpatient Medications   Medication Sig Dispense Refill    fluticasone (FLONASE) 50 MCG/ACT nasal spray 2 sprays by Each Nostril route daily 2 Bottle 1    escitalopram (LEXAPRO) 10 MG tablet Take 1 tablet by mouth daily 30 tablet 3    albuterol sulfate (PROAIR RESPICLICK) 756 (90 Base) MCG/ACT aerosol powder inhalation Inhale 2 puffs into the lungs every 6 hours as needed for Wheezing or Shortness of Breath 1 Inhaler 0     No current facility-administered medications for this visit. Orders Placed This Encounter   Medications    fluticasone (FLONASE) 50 MCG/ACT nasal spray     Si sprays by Each Nostril route daily     Dispense:  2 Bottle     Refill:  1    escitalopram (LEXAPRO) 10 MG tablet     Sig: Take 1 tablet by mouth daily     Dispense:  30 tablet     Refill:  3         All medications reviewed and reconciled, including OTC and herbal medications. Updated list given to patient. Patient Active Problem List    Diagnosis Date Noted    Anosmia 2020    Lymphadenopathy, submental 2020    History of Bell's palsy     Vertigo      Recurrent vertigo      Major depression        Past Medical History:   Diagnosis Date    History of Bell's palsy     Major depression     Vertigo Started age 22    Recurrent vertigo         History reviewed. No pertinent surgical history. Allergies   Allergen Reactions    Ibuprofen Anaphylaxis         Social History     Tobacco Use    Smoking status: Never Smoker    Smokeless tobacco: Never Used   Substance Use Topics    Alcohol use: No         Family History   Problem Relation Age of Onset    COPD Mother          I have reviewed the patient's past medical history, past surgical history, allergies, medications, social and family history and I have made updates where appropriate.       Review of Systems  Positive responses are highlighted in bold    Constitutional:  Fever, Chills, Night Sweats, Fatigue, Unexpected changes in weight  Eyes:  Eye discharge, Eye pain, Eye redness, Visual disturbances   HENT:  Ear pain, Tinnitus, Nosebleeds, Trouble swallowing, Hearing loss, Sore throat  Cardiovascular:  Chest Pain, Palpitations, Orthopnea, Paroxysmal Nocturnal Dyspnea  Respiratory:  Cough, Wheezing, Shortness of breath, Chest tightness, Apnea  Gastrointestinal:  Nausea, Vomiting, Diarrhea, Constipation, Heartburn, Blood in stool  Genitourinary:  Difficulty or painful urination, Flank pain, Change in frequency, Urgency  Skin: Shuffling gait No  Narrow base of support No  Able to stand without using arms  Yes  Bradykinesia  No  Tremor No  Psych: Affect constricted. Mood depressed. Thought process is normal without evidence of psychosis. Good insight and appropriate interaction. Cognition and memory appear to be intact. Skin: warm and dry, no rash or erythema to visible areas. ASSESSMENT & PLAN  1. Anosmia    Unclear etiology  Going on a while  Will add some flonase  Will refer to ENT  Further eval based on what ENT finds    - fluticasone (FLONASE) 50 MCG/ACT nasal spray; 2 sprays by Each Nostril route daily  Dispense: 2 Bottle; Refill: 1  - Lily Dorantes MD, Otolaryngology, [facility]    2. Lymphadenopathy, submental    Area of concern most c/w small enlarged LN  No other concerning historical or physical findings  Will have him monitor for 6 wks  If no better by then, or if getting larger, will pursue further w/u  He will call with any changes in the meantime. 3. Current moderate episode of major depressive disorder without prior episode ([address]    Depressed for a while  Worse of late  Will start lexapro  Will see him back in 6 wks    Discussed side effects and benefits of lexapro. I advised him that if he develops any SI/HI or concerning symptoms after starting the medication he needs to stop the medication and seek treatment immediately    - escitalopram (LEXAPRO) 10 MG tablet; Take 1 tablet by mouth daily  Dispense: 30 tablet; Refill: 3    4. Vertigo    Unclear hx of on this  Pt very adamant he didn't want to work this up further  However, he states he may be more comfortable to discuss at his f/u visit  Will be able to more of an exam at that point  At this time is stable and going on for 20 years, so will have him con't to manage as he has. Will reassess in 6 wks      DISPOSITION    Return in about 6 weeks (around 5/11/2020). Frannie Bishop released without restrictions.     Future Appointments   Date Time Provider Department Center   5/14/2020 11:00 AM [address]     PATIENT COUNSELING    Counseling was provided today regarding the following topics: Healthy eating habits, Regular exercise, substance abuse and healthy sleep habits. Shad Cesar received counseling on the following healthy behaviors: nutrition, exercise and medication adherence    Barriers to learning and self management: none    Discussed use, benefit, and side effects of prescribed medications. Barriers to medication compliance addressed. All patient questions answered. Pt voiced understanding.        Electronically signed by Gurmeet Orona DO on 3/30/2020 at 2:43 PM

## 2020-03-30 ENCOUNTER — TELEMEDICINE (OUTPATIENT)
Dept: FAMILY MEDICINE CLINIC | Age: 45
End: 2020-03-30
Payer: COMMERCIAL

## 2020-03-30 VITALS — WEIGHT: 250 LBS | RESPIRATION RATE: 18 BRPM | BODY MASS INDEX: 37.89 KG/M2 | HEIGHT: 68 IN | HEART RATE: 88 BPM

## 2020-03-30 PROBLEM — R59.1 LYMPHADENOPATHY: Status: ACTIVE | Noted: 2020-03-30

## 2020-03-30 PROBLEM — R43.0 ANOSMIA: Status: ACTIVE | Noted: 2020-03-30

## 2020-03-30 PROCEDURE — 99204 OFFICE O/P NEW MOD 45 MIN: CPT | Performed by: FAMILY MEDICINE

## 2020-03-30 RX ORDER — FLUTICASONE PROPIONATE 50 MCG
2 SPRAY, SUSPENSION (ML) NASAL DAILY
Qty: 2 BOTTLE | Refills: 1 | Status: SHIPPED | OUTPATIENT
Start: 2020-03-30 | End: 2020-11-03 | Stop reason: SDUPTHER

## 2020-03-30 RX ORDER — ESCITALOPRAM OXALATE 10 MG/1
10 TABLET ORAL DAILY
Qty: 30 TABLET | Refills: 3 | Status: SHIPPED | OUTPATIENT
Start: 2020-03-30 | End: 2020-04-03 | Stop reason: SINTOL

## 2020-03-31 ENCOUNTER — TELEPHONE (OUTPATIENT)
Dept: ENT CLINIC | Age: 45
End: 2020-03-31

## 2020-03-31 NOTE — TELEPHONE ENCOUNTER
At available with Dr. Downs Hopping after Coronavirus pandemic improves and resume scheduling patients again. This has been ongoing for 2+ years according to PCP note. He does also have a submental lymph node, but PCP is treating and monitoring for now.

## 2020-04-03 ENCOUNTER — TELEMEDICINE (OUTPATIENT)
Dept: FAMILY MEDICINE CLINIC | Age: 45
End: 2020-04-03
Payer: COMMERCIAL

## 2020-04-03 VITALS — RESPIRATION RATE: 16 BRPM

## 2020-04-03 PROCEDURE — 99213 OFFICE O/P EST LOW 20 MIN: CPT | Performed by: FAMILY MEDICINE

## 2020-04-03 RX ORDER — SERTRALINE HYDROCHLORIDE 25 MG/1
25 TABLET, FILM COATED ORAL DAILY
Qty: 7 TABLET | Refills: 0 | Status: SHIPPED | OUTPATIENT
Start: 2020-04-03 | End: 2020-07-31

## 2020-05-06 ENCOUNTER — TELEPHONE (OUTPATIENT)
Dept: FAMILY MEDICINE CLINIC | Age: 45
End: 2020-05-06

## 2020-05-25 ENCOUNTER — TELEPHONE (OUTPATIENT)
Dept: FAMILY MEDICINE CLINIC | Age: 45
End: 2020-05-25

## 2020-07-21 ENCOUNTER — TELEPHONE (OUTPATIENT)
Dept: FAMILY MEDICINE CLINIC | Age: 45
End: 2020-07-21

## 2020-07-21 NOTE — TELEPHONE ENCOUNTER
REFERRAL TO ENT-    Referral placed on 3/30/2020. On 03/31/20 Sent in basket to ENT due to dx; Chart sent to provider for review and to advise as to how soon patient needs to be seen. RESPONSE:   LISA Zuniga 2 minutes ago (3:25 PM)        At available with Dr. Maria D Maxwell after Coronavirus pandemic improves and resume scheduling patients again. This has been ongoing for 2+ years according to PCP note. He does also have a submental lymph node, but PCP is treating and monitoring for now      Will call patient after pandemic improves    On 5/28/20  Attempted to call patient and no answer - voicemail not set up    On 5/28/20  Attempted to call patient and no answer - voicemail not set up    No more scheduling attempts will be made. No response from patient. Ok to cancel?

## 2020-07-31 ENCOUNTER — HOSPITAL ENCOUNTER (EMERGENCY)
Age: 45
Discharge: HOME | End: 2020-07-31
Payer: COMMERCIAL

## 2020-07-31 VITALS
DIASTOLIC BLOOD PRESSURE: 78 MMHG | TEMPERATURE: 98.3 F | HEART RATE: 78 BPM | OXYGEN SATURATION: 98 % | RESPIRATION RATE: 16 BRPM | SYSTOLIC BLOOD PRESSURE: 132 MMHG

## 2020-07-31 PROCEDURE — 99213 OFFICE O/P EST LOW 20 MIN: CPT

## 2020-07-31 PROCEDURE — 99213 OFFICE O/P EST LOW 20 MIN: CPT | Performed by: NURSE PRACTITIONER

## 2020-07-31 NOTE — ED PROVIDER NOTES
[facility]       CHIEF COMPLAINT       Chief Complaint   Patient presents with    Fatigue       Nurses Notes reviewed and I agree except as noted in the HPI. HISTORY OF PRESENT ILLNESS   Barbara Meza is a 40 y.o. male who presents     Patient is present in the urgent care today with complaints of generalized fatigue that he has noticed within the last 24 to 48 hours. 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 Denies any fevers, coughs, headaches, or any sore throat. REVIEW OF SYSTEMS     Review of Systems   Constitutional: Positive for fatigue. Negative for chills and fever. HENT: Negative for congestion, ear pain, postnasal drip, sinus pressure, sinus pain and sore throat. Respiratory: Negative for cough, chest tightness, shortness of breath, wheezing and stridor. Cardiovascular: Negative for chest pain. Gastrointestinal: Negative for diarrhea, nausea and vomiting. Musculoskeletal: Negative for myalgias. Skin: Negative for rash. Allergic/Immunologic: Negative for environmental allergies, food allergies and immunocompromised state. Neurological: Negative for dizziness, weakness, light-headedness and headaches. PAST MEDICAL HISTORY         Diagnosis Date    History of Bell's palsy     Major depression     Obesity (BMI 30-39. 9)     Vertigo Started age 22    Recurrent vertigo       SURGICALHISTORY     Patient  has no past surgical history on file.     CURRENT MEDICATIONS       Discharge Medication List as of 7/31/2020  6:50 PM      CONTINUE these medications which have NOT CHANGED    Details   fluticasone (FLONASE) 50 MCG/ACT nasal spray 2 sprays by Each Nostril route daily, Disp-2 Bottle, R-1Normal      albuterol sulfate (PROAIR RESPICLICK) 223 (90 Base) MCG/ACT aerosol powder inhalation Inhale 2 puffs into the lungs every 6 hours as needed for Wheezing or Shortness of Breath, Disp-1 Inhaler, R-0Print             ALLERGIES     Patient is is allergic to ibuprofen. Patients   There is no immunization history on file for this patient. FAMILY HISTORY     Patient's family history includes COPD in his mother. SOCIAL HISTORY     Patient  reports that he has never smoked. He has never used smokeless tobacco. He reports that he does not drink alcohol or use drugs. PHYSICAL EXAM     ED TRIAGE VITALS  BP: 132/78, Temp: 98.3 °F (36.8 °C), Pulse: 78, Resp: 16, SpO2: 98 %,Estimated body mass index is 38.29 kg/m² as calculated from the following:    Height as of 3/30/20: 5' 7.75\" (1.721 m). Weight as of 3/30/20: 250 lb (113.4 kg). ,No LMP for male patient. Physical Exam  Constitutional:       General: He is not in acute distress. Appearance: Normal appearance. He is not ill-appearing, toxic-appearing or diaphoretic. HENT:      Nose: Nose normal. No congestion or rhinorrhea. Mouth/Throat:      Mouth: Mucous membranes are moist.      Pharynx: No oropharyngeal exudate or posterior oropharyngeal erythema. Cardiovascular:      Rate and Rhythm: Normal rate. Pulses: Normal pulses. Heart sounds: Normal heart sounds. No murmur. No friction rub. No gallop. Pulmonary:      Effort: Pulmonary effort is normal. No respiratory distress. Breath sounds: Normal breath sounds. No stridor. No wheezing, rhonchi or rales. Chest:      Chest wall: No tenderness. Musculoskeletal: Normal range of motion. Skin:     General: Skin is warm. Findings: No erythema or rash. Neurological:      General: No focal deficit present. Mental Status: He is alert and oriented to person, place, and time. Sensory: No sensory deficit.    Psychiatric:         Mood and Affect: Mood normal.         Behavior: Behavior normal.         Thought Content: Thought content normal.         Judgment: Judgment normal.         DIAGNOSTIC RESULTS     Labs:No results found for this visit on 07/31/20. IMAGING:    No orders to display     URGENT CARE COURSE:     Vitals:    07/31/20 1903   BP: 132/78   Pulse: 78   Resp: 16   Temp: 98.3 °F (36.8 °C)   TempSrc: Temporal   SpO2: 98%       Medications - No data to display         PROCEDURES:  None    FINAL IMPRESSION      1. Fatigue, unspecified type          DISPOSITION/ PLAN   Patient is discharged home with instructions to self quarantine until COVID-19 results have been obtained, this can take up to 4 to 5 days. Patient is informed that if he does have a positive reading he will need to follow-up with his primary care provider and/or health department for reevaluation. If symptoms cannot be managed in a home setting he will need to go to the ER. The symptoms include shortness of breath, extreme body aches, or fevers that cannot be controlled with Tylenol.         PATIENT REFERRED TO:  Marialuisa Barrow DO  [facility] [facility] [address] Ayleen Newell [facility] [address]      DISCHARGE MEDICATIONS:  Discharge Medication List as of 7/31/2020  6:50 PM          Discharge Medication List as of 7/31/2020  6:50 PM      STOP taking these medications       sertraline (ZOLOFT) 25 MG tablet Comments:   Reason for Stopping:               Discharge Medication List as of 7/31/2020  6:50 PM          ARTURO Piedra NP    (Please note that portions of this note were completed with a voice recognition program. Efforts were made to edit the dictations but occasionally words are mis-transcribed.)          ARTURO Acevedo NP  08/01/20 [id number]

## 2020-07-31 NOTE — ED TRIAGE NOTES
Hitesh Bridges arrives by self  to room with complaint of fatigue symptoms started today. Hitesh Bridges mentioned he was out in the sun yesterday and anytime he is out in sun for a period of time he becomes fatigued.

## 2020-08-01 ASSESSMENT — ENCOUNTER SYMPTOMS
CHEST TIGHTNESS: 0
STRIDOR: 0
COUGH: 0
VOMITING: 0
SINUS PAIN: 0
WHEEZING: 0
SORE THROAT: 0
SHORTNESS OF BREATH: 0
DIARRHEA: 0
NAUSEA: 0
SINUS PRESSURE: 0

## 2020-11-03 ENCOUNTER — HOSPITAL ENCOUNTER (EMERGENCY)
Age: 45
Discharge: HOME | End: 2020-11-03
Payer: COMMERCIAL

## 2020-11-03 VITALS
DIASTOLIC BLOOD PRESSURE: 89 MMHG | SYSTOLIC BLOOD PRESSURE: 178 MMHG | WEIGHT: 275 LBS | HEART RATE: 65 BPM | BODY MASS INDEX: 42.12 KG/M2 | RESPIRATION RATE: 16 BRPM | OXYGEN SATURATION: 97 % | TEMPERATURE: 99.1 F

## 2020-11-03 PROCEDURE — 99212 OFFICE O/P EST SF 10 MIN: CPT

## 2020-11-03 PROCEDURE — 99213 OFFICE O/P EST LOW 20 MIN: CPT | Performed by: NURSE PRACTITIONER

## 2020-11-03 RX ORDER — FLUTICASONE PROPIONATE 50 MCG
1 SPRAY, SUSPENSION (ML) NASAL DAILY
Qty: 1 BOTTLE | Refills: 0 | Status: SHIPPED | OUTPATIENT
Start: 2020-11-03 | End: 2021-11-16

## 2020-11-03 RX ORDER — GUAIFENESIN AND PSEUDOEPHEDRINE HCL 1200; 120 MG/1; MG/1
1 TABLET, EXTENDED RELEASE ORAL 2 TIMES DAILY PRN
Qty: 20 TABLET | Refills: 0 | Status: SHIPPED | OUTPATIENT
Start: 2020-11-03 | End: 2021-11-16

## 2020-11-03 ASSESSMENT — ENCOUNTER SYMPTOMS
SINUS PRESSURE: 1
NAUSEA: 0
COUGH: 0
VOMITING: 0
SHORTNESS OF BREATH: 0

## 2020-11-04 NOTE — ED PROVIDER NOTES
[place]  Urgent Care Encounter       CHIEF COMPLAINT       Chief Complaint   Patient presents with    Facial Pain     sinus pressure. onset last night       Nurses Notes reviewed and I agree except as noted in the HPI. HISTORY OF PRESENT ILLNESS   Dagoberto Zamora is a 39 y.o. male who presents for evaluation of very mild sinus pressure that began last night into this morning. Patient denies any cough, sore throat, chills, fever, body aches, or known sick exposures. He denies any history of seasonal allergies. States that he did take Tylenol at home but denies any other medications at this time. Patient denies any significant headache, visual disturbances at this time. States that he did drink an energy drink shortly before arrival at the urgent care. The history is provided by the patient. REVIEW OF SYSTEMS     Review of Systems   Constitutional: Negative for chills and fever. HENT: Positive for sinus pressure. Negative for congestion. Respiratory: Negative for cough and shortness of breath. Cardiovascular: Negative for chest pain. Gastrointestinal: Negative for nausea and vomiting. Musculoskeletal: Negative for myalgias. Skin: Negative for rash. Allergic/Immunologic: Negative for environmental allergies. Neurological: Negative for headaches. PAST MEDICAL HISTORY         Diagnosis Date    History of Bell's palsy     Major depression     Obesity (BMI 30-39. 9)     Vertigo Started age 22    Recurrent vertigo       SURGICALHISTORY     Patient  has no past surgical history on file. CURRENT MEDICATIONS       Previous Medications    ALBUTEROL SULFATE (PROAIR RESPICLICK) 983 (90 BASE) MCG/ACT AEROSOL POWDER INHALATION    Inhale 2 puffs into the lungs every 6 hours as needed for Wheezing or Shortness of Breath       ALLERGIES     Patient is is allergic to ibuprofen. Patients   There is no immunization history on file for this patient.     FAMILY HISTORY     Patient's family history includes COPD in his mother. SOCIAL HISTORY     Patient  reports that he has never smoked. He has never used smokeless tobacco. He reports that he does not drink alcohol or use drugs. PHYSICAL EXAM     ED TRIAGE VITALS  BP: (!) 178/89, Temp: 99.1 °F (37.3 °C), Pulse: 65, Resp: 16, SpO2: 97 %,Estimated body mass index is 42.12 kg/m² as calculated from the following:    Height as of 3/30/20: 5' 7.75\" (1.721 m). Weight as of this encounter: 275 lb (124.7 kg). ,No LMP for male patient. Physical Exam  Vitals signs and nursing note reviewed. Constitutional:       General: He is not in acute distress. Appearance: He is well-developed. He is not diaphoretic. HENT:      Right Ear: Tympanic membrane and ear canal normal.      Left Ear: Tympanic membrane and ear canal normal.      Nose:      Right Sinus: Maxillary sinus tenderness present. No frontal sinus tenderness. Left Sinus: Maxillary sinus tenderness present. No frontal sinus tenderness. Mouth/Throat:      Mouth: Mucous membranes are moist.      Pharynx: Oropharynx is clear. Tonsils: No tonsillar exudate. Eyes:      Conjunctiva/sclera:      Right eye: Right conjunctiva is not injected. Left eye: Left conjunctiva is not injected. Pupils: Pupils are equal.   Neck:      Musculoskeletal: Normal range of motion. Cardiovascular:      Rate and Rhythm: Normal rate and regular rhythm. Heart sounds: No murmur. Pulmonary:      Effort: Pulmonary effort is normal. No respiratory distress. Breath sounds: Normal breath sounds. Musculoskeletal:      Right knee: He exhibits normal range of motion. Left knee: He exhibits normal range of motion. Lymphadenopathy:      Head:      Right side of head: No tonsillar adenopathy. Left side of head: No tonsillar adenopathy. Cervical: No cervical adenopathy. Skin:     General: Skin is warm. Findings: No rash.    Neurological: Mental Status: He is alert and oriented to person, place, and time. Psychiatric:         Behavior: Behavior normal.         DIAGNOSTIC RESULTS     Labs:No results found for this visit on 11/03/20. IMAGING:    No orders to display         EKG: none      URGENT CARE COURSE:     Vitals:    11/03/20 1928   BP: (!) 178/89   Pulse: 65   Resp: 16   Temp: 99.1 °F (37.3 °C)   TempSrc: Temporal   SpO2: 97%   Weight: 275 lb (124.7 kg)       Medications - No data to display         PROCEDURES:  None    FINAL IMPRESSION      1. Sinus congestion          DISPOSITION/ PLAN       I discussed with the patient that exam is consistent with very mild sinus congestion and he is offered a prescription for Mucinex D and Flonase. I did offer a Covid test at this time, however based on the lack of other symptoms I do not believe it is entirely necessary and the patient is agreeable to holding off and will monitor his symptoms at home. He will return or follow-up with his primary care provider if he would begin to develop any other concerning Covid symptoms. He is agreeable to plan as discussed.     PATIENT REFERRED TO:  [address]ene [address] [id number]      DISCHARGE MEDICATIONS:  New Prescriptions    FLUTICASONE (FLONASE) 50 MCG/ACT NASAL SPRAY    1 spray by Each Nostril route daily    PSEUDOEPHEDRINE-GUAIFENESIN (MUCINEX D MAX STRENGTH) 120-1200 MG TB12    Take 1 tablet by mouth 2 times daily as needed (cough/congestion)       Discontinued Medications    FLUTICASONE (FLONASE) 50 MCG/ACT NASAL SPRAY    2 sprays by Each Nostril route daily       Current Discharge Medication List      CONTINUE these medications which have CHANGED    Details   fluticasone (FLONASE) 50 MCG/ACT nasal spray 1 spray by Each Nostril route daily  Qty: 1 Bottle, Refills: 0             ARTURO Alex CNP    (Please note that portions of this note were completed with a voice recognition program. Efforts were made to edit the dictations but occasionally words are mis-transcribed.)          Daphnie Delarosa, ARTURO - NINI  11/03/20 1945

## 2020-11-04 NOTE — ED NOTES
Pt discharged. Pt verbalized understanding of discharge instructions and scripts. Pt walked out per self in stable condition.      René Mcgill LPN  94/18/24 0967

## 2021-07-17 ENCOUNTER — HOSPITAL ENCOUNTER (EMERGENCY)
Age: 46
Discharge: HOME | End: 2021-07-17
Payer: COMMERCIAL

## 2021-07-17 VITALS
OXYGEN SATURATION: 97 % | WEIGHT: 264 LBS | DIASTOLIC BLOOD PRESSURE: 70 MMHG | TEMPERATURE: 97.9 F | BODY MASS INDEX: 40.44 KG/M2 | HEART RATE: 55 BPM | SYSTOLIC BLOOD PRESSURE: 143 MMHG | RESPIRATION RATE: 98 BRPM

## 2021-07-17 DIAGNOSIS — S46.212A TRAUMATIC PARTIAL TEAR OF LEFT BICEPS TENDON, INITIAL ENCOUNTER: Primary | ICD-10-CM

## 2021-07-17 PROCEDURE — 99213 OFFICE O/P EST LOW 20 MIN: CPT

## 2021-07-17 PROCEDURE — 99213 OFFICE O/P EST LOW 20 MIN: CPT | Performed by: EMERGENCY MEDICINE

## 2021-07-17 ASSESSMENT — ENCOUNTER SYMPTOMS
COUGH: 0
COLOR CHANGE: 0
SHORTNESS OF BREATH: 0

## 2021-07-17 ASSESSMENT — PAIN SCALES - GENERAL: PAINLEVEL_OUTOF10: 8

## 2021-07-17 ASSESSMENT — PAIN - FUNCTIONAL ASSESSMENT: PAIN_FUNCTIONAL_ASSESSMENT: PREVENTS OR INTERFERES WITH MANY ACTIVE NOT PASSIVE ACTIVITIES

## 2021-07-17 ASSESSMENT — PAIN DESCRIPTION - ORIENTATION: ORIENTATION: LEFT;UPPER

## 2021-07-17 ASSESSMENT — PAIN DESCRIPTION - DESCRIPTORS: DESCRIPTORS: ACHING;THROBBING

## 2021-07-17 ASSESSMENT — PAIN DESCRIPTION - FREQUENCY: FREQUENCY: CONTINUOUS

## 2021-07-17 ASSESSMENT — PAIN DESCRIPTION - LOCATION: LOCATION: ARM

## 2021-07-17 ASSESSMENT — PAIN DESCRIPTION - PAIN TYPE: TYPE: ACUTE PAIN

## 2021-07-17 NOTE — ED PROVIDER NOTES
[facility]       CHIEF COMPLAINT       Chief Complaint   Patient presents with    Arm Injury     left upper       Nurses Notes reviewed and I agree except as noted in the HPI. HISTORY OF PRESENT ILLNESS   Lv Peña is a 39 y.o. male who presents for complaints of left bicep pain. Patient states he lifted a heavy object yesterday. While lifting he felt a sudden pop in his left bicep. He states since then he cannot make a muscle with his bicep and has difficulty flexing his left arm. Patient feels pain in the shoulder and bicep area. Patient reports his  strength is still normal.  No numbness or tingling down his hands. HPI    REVIEW OF SYSTEMS     Review of Systems   Constitutional: Negative for fatigue and fever. Respiratory: Negative for cough and shortness of breath. Musculoskeletal: Positive for myalgias (left biceps). Skin: Negative for color change. Neurological: Negative for dizziness. Psychiatric/Behavioral: Negative for behavioral problems. PAST MEDICAL HISTORY         Diagnosis Date    History of Bell's palsy     Major depression     Obesity (BMI 30-39. 9)     Vertigo Started age 22    Recurrent vertigo       SURGICALHISTORY     Patient  has no past surgical history on file.     CURRENT MEDICATIONS       Discharge Medication List as of 7/17/2021  2:00 PM      CONTINUE these medications which have NOT CHANGED    Details   fluticasone (FLONASE) 50 MCG/ACT nasal spray 1 spray by Each Nostril route daily, Disp-1 Bottle,R-0Normal      Pseudoephedrine-guaiFENesin (MUCINEX D MAX STRENGTH) 120-1200 MG TB12 Take 1 tablet by mouth 2 times daily as needed (cough/congestion), Disp-20 tablet,R-0Normal      albuterol sulfate (PROAIR RESPICLICK) 661 (90 Base) MCG/ACT aerosol powder inhalation Inhale 2 puffs into the lungs every 6 hours as needed for Wheezing or Shortness of Breath, Disp-1 Inhaler, R-0Print             ALLERGIES Patient is is allergic to ibuprofen. Patients   There is no immunization history on file for this patient. FAMILY HISTORY     Patient's family history includes COPD in his mother. SOCIAL HISTORY     Patient  reports that he has never smoked. He has never used smokeless tobacco. He reports that he does not drink alcohol and does not use drugs. PHYSICAL EXAM     ED TRIAGE VITALS  BP: (!) 143/70, Temp: 97.9 °F (36.6 °C), Pulse: 55, Resp: (!) 98, SpO2: 97 %,Estimated body mass index is 40.44 kg/m² as calculated from the following:    Height as of 3/30/20: 5' 7.75\" (1.721 m). Weight as of this encounter: 264 lb (119.7 kg). ,No LMP for male patient. Physical Exam  Constitutional:       Appearance: He is obese. He is not ill-appearing. HENT:      Head: Normocephalic. Cardiovascular:      Rate and Rhythm: Normal rate. Pulses: Normal pulses. Pulmonary:      Effort: Pulmonary effort is normal.   Musculoskeletal:      Left upper arm: Tenderness present. Comments: Patient has difficulty flexing his left arm with resistance. There is tenderness to the proximal aspect of his bicep. There does appear to be a dip in the area of his proximal bicep when compared to right upper extremity. Skin:     General: Skin is warm and dry. Capillary Refill: Capillary refill takes less than 2 seconds. Neurological:      General: No focal deficit present. Mental Status: He is alert. Psychiatric:         Mood and Affect: Mood normal.         DIAGNOSTIC RESULTS     Labs:No results found for this visit on 07/17/21. IMAGING:    No orders to display         EKG:      URGENT CARE COURSE:     Vitals:    07/17/21 1348 07/17/21 1403   BP: (!) 143/70    Pulse: 55    Resp: (!) 98    Temp: 97.9 °F (36.6 °C)    TempSrc: Temporal    SpO2:  97%   Weight: 264 lb (119.7 kg)        Medications - No data to display         PROCEDURES:  None    FINAL IMPRESSION      1.  Traumatic partial tear of left biceps

## 2021-07-17 NOTE — ED TRIAGE NOTES
Patient states after lifting heavy trash bag, left upper arm pain started yesterday. Pain lifting left arm. No OTC med taken.

## 2021-07-20 ENCOUNTER — TELEPHONE (OUTPATIENT)
Dept: FAMILY MEDICINE CLINIC | Age: 46
End: 2021-07-20

## 2021-07-20 ENCOUNTER — OFFICE VISIT (OUTPATIENT)
Dept: FAMILY MEDICINE CLINIC | Age: 46
End: 2021-07-20
Payer: COMMERCIAL

## 2021-07-20 VITALS
HEART RATE: 63 BPM | HEIGHT: 68 IN | SYSTOLIC BLOOD PRESSURE: 138 MMHG | WEIGHT: 270.6 LBS | DIASTOLIC BLOOD PRESSURE: 86 MMHG | TEMPERATURE: 98.2 F | RESPIRATION RATE: 14 BRPM | OXYGEN SATURATION: 97 % | BODY MASS INDEX: 41.01 KG/M2

## 2021-07-20 DIAGNOSIS — S46.212D TEAR OF LEFT BICEPS MUSCLE, SUBSEQUENT ENCOUNTER: Primary | ICD-10-CM

## 2021-07-20 PROCEDURE — 99214 OFFICE O/P EST MOD 30 MIN: CPT | Performed by: NURSE PRACTITIONER

## 2021-07-20 RX ORDER — HYDROXYZINE PAMOATE 25 MG/1
25 CAPSULE ORAL 3 TIMES DAILY PRN
Qty: 30 CAPSULE | Refills: 0 | Status: SHIPPED | OUTPATIENT
Start: 2021-07-20 | End: 2021-08-03

## 2021-07-20 SDOH — ECONOMIC STABILITY: FOOD INSECURITY: WITHIN THE PAST 12 MONTHS, YOU WORRIED THAT YOUR FOOD WOULD RUN OUT BEFORE YOU GOT MONEY TO BUY MORE.: NEVER TRUE

## 2021-07-20 SDOH — ECONOMIC STABILITY: FOOD INSECURITY: WITHIN THE PAST 12 MONTHS, THE FOOD YOU BOUGHT JUST DIDN'T LAST AND YOU DIDN'T HAVE MONEY TO GET MORE.: NEVER TRUE

## 2021-07-20 ASSESSMENT — SOCIAL DETERMINANTS OF HEALTH (SDOH): HOW HARD IS IT FOR YOU TO PAY FOR THE VERY BASICS LIKE FOOD, HOUSING, MEDICAL CARE, AND HEATING?: NOT HARD AT ALL

## 2021-07-20 NOTE — PROGRESS NOTES
[address] MEDICINE [address]  [address] [place] [address]  Dept: [phone]  Loc: [phone]  Visit Date: 7/20/2021      Chief Complaint:   Rohan Morrow is a 39 y.o. male thatpresents for Other (left torn bicep 3 or 4 days ago was at urgent care )        HPI:  Left bicep tear  Seen at Urgent Care  Told to follow up at [facility]  Not taking anything for spasms  Supposed to go back to work today, unable to lift    The patient comes in with his wife today. History obtained from pt, wife and medical records. I have reviewed the patient's past medical history, past surgical history, allergies,medications, social and family history and I have made updates where appropriate. Past Medical History:   Diagnosis Date    History of Bell's palsy     Major depression     Obesity (BMI 30-39. 9)     Vertigo Started age 22    Recurrent vertigo       History reviewed. No pertinent surgical history. Social History     Tobacco Use    Smoking status: Never Smoker    Smokeless tobacco: Never Used   Vaping Use    Vaping Use: Never used   Substance Use Topics    Alcohol use: No    Drug use: No       Family History   Problem Relation Age of Onset    COPD Mother          Review of Systems   Musculoskeletal:        Left bicep pain and spasming               PHYSICAL EXAM:  /86   Pulse 63   Temp 98.2 °F (36.8 °C) (Infrared)   Resp 14   Ht 5' 7.75\" (1.721 m)   Wt 270 lb 9.6 oz (122.7 kg)   SpO2 97%   BMI 41.45 kg/m²     Physical Exam  Constitutional:       Appearance: Normal appearance. HENT:      Head: Normocephalic and atraumatic. Right Ear: External ear normal.      Left Ear: External ear normal.      Nose: Nose normal.      Mouth/Throat:      Mouth: Mucous membranes are moist.   Eyes:      Conjunctiva/sclera: Conjunctivae normal.   Cardiovascular:      Rate and Rhythm: Normal rate and regular rhythm. Pulses: Normal pulses.       Heart sounds: Normal heart sounds. Pulmonary:      Effort: Pulmonary effort is normal.      Breath sounds: Normal breath sounds. Abdominal:      General: Bowel sounds are normal.      Palpations: Abdomen is soft. Musculoskeletal:         General: Signs of injury present. Normal range of motion. Cervical back: Normal range of motion. Skin:     General: Skin is warm and dry. Neurological:      General: No focal deficit present. Mental Status: He is alert and oriented to person, place, and time. Psychiatric:         Mood and Affect: Mood normal.         Behavior: Behavior normal.             ASSESSMENT & PLAN  Otf Leary was seen today for other. Diagnoses and all orders for this visit:    Tear of left biceps muscle, subsequent encounter    Other orders  -     hydrOXYzine (VISTARIL) 25 MG capsule; Take 1 capsule by mouth 3 times daily as needed (muscle spasms)    Start Vistaril for spasms  Rest  Go to [facility]  Off work until next week  Will send message Monday with update    No follow-ups on file. Otf Leary received counseling on the following healthy behaviors: medication adherence  Reviewedprior labs and health maintenance. Continue current medications, diet and exercise. Discussed use, benefit, and side effects of prescribed medications. Barriers to medication compliance addressed. Patient given educationalmaterials - see patient instructions. All patient questions answered. Patient voiced understanding.      Electronically signed by ARTURO Diaz - CNP, APRN on 7/20/21 at 3:44 PM EDT

## 2021-07-20 NOTE — TELEPHONE ENCOUNTER
----- Message from Ratna Alston sent at 7/19/2021  5:05 PM EDT -----  Subject: Appointment Request    Reason for Call: Routine Existing Condition Follow Up    QUESTIONS  Type of Appointment? Established Patient  Reason for appointment request? No appointments available during search  Additional Information for Provider? patient has a torn right bicept not   able to schedule in [facility], screened green please call   ---------------------------------------------------------------------------  --------------  CALL BACK INFO  What is the best way for the office to contact you? OK to leave message on   voicemail  Preferred Call Back Phone Number? [phone]  ---------------------------------------------------------------------------  --------------  SCRIPT ANSWERS  Relationship to Patient? Other  Representative Name? Stu Mireles  Additional information verified (besides Name and Date of Birth)? Address  Appointment reason? Well Care/Follow Ups  Select a Well Care/Follow Ups appointment reason? Adult Existing Condition   Follow Up [Diabetes, CHF, COPD, Hypertension/Blood Pressure Check]  (Is the patient requesting to be seen urgently for their symptoms?)? No  Is this follow up request related to routine Diabetes Management? No  Are you having any new concerns about your existing condition? No  Have you been diagnosed with, awaiting test results for, or told that you   are suspected of having COVID-19 (Coronavirus)? (If patient has tested   negative or was tested as a requirement for work, school, or travel and   not based on symptoms, answer no)? No  Do you currently have flu-like symptoms including fever or chills, cough,   shortness of breath, difficulty breathing, or new loss of taste or smell? No  Have you had close contact with someone with COVID-19 in the last 14 days? No  (Service Expert - click yes below to proceed with [employer] As Usual   Scheduling)?  Yes

## 2021-07-20 NOTE — TELEPHONE ENCOUNTER
Indication: A Fib; CHADSVASC = 4 (HTN,Age,DM,female)  Goal INR: 2.0-2.5  Duration: long term  Order Expiration Date: 01/14/21    Pertinent History: Patient was admitted to [facility] 10/2015 for massive hemoptysis, which started with a cough and this was associated with some respiratory distress. No active bleeding or endobronchial lesions were visualized. It was thought that she had bronchiectasis and the mucosa was inflamed and  to bleeding. Her hemoptysis had resolved prior to the bronchoscopy, as her anticoagulation was reversed and it has improved. Patient to continue warfarin but with goal INR range of 2.0-2.5.  04/11-Patient received new home meter and resumed self testing today.        Dialysis 5 days/wk    Assessment  High-Risk Medications: Patient takes levothyroxine, allopurinol,  escitalopram, and nephro vitamins .  According to micromedex there may be an increased risk of bleeding with each medication when taken concurrently with warfarin.    Significant Findings: 12/13- patient report 0 vitk in the last week.    Hospitalizations / Procedures: 01/14-Patient had a procedure on her HD catheter on 01/08 and held warfarin x2 days 01/06 and 01/07 per surgeon's request.  [facility] unaware of procedure or need for warfarin hold.  Instructed patient to notify [facility] of procedures going forward.   Vitamin K goal: 2 cups/wk     Plan  4mg tablet    INR Result: 1.5   The INR result for today is subtherapeutic based on the INR goal 2.0-3.0  Recommended Dose: 8mgx1(Tu) then continue  6mgMWF;4mgROW (34mg/wk)  Etio: warfarin hold  Follow-Up: 2 weeks: 01/27/20 (self test)  Comments:     Counseling  Counseled about signs/symptoms of thrombosis and/or stroke and when to seek emergent care  Stressed importance of compliance with exact recommended dosage regimen  Instructed to notify clinic about medication changes or health status changes  Instructed to notify clinic about scheduled procedures    Patient (or family/caregiver)  No answer/no vm set-up verbalized understanding and agreement with plan.

## 2021-07-20 NOTE — LETTER
[address] [facility]  [address] [address]  Phone: [phone]  Fax: [phone]    ARTURO Garay CNP        July 20, 2021     Patient: Denise James   YOB: 1975   Date of Visit: 7/20/2021       To Whom It May Concern: It is my medical opinion that Angélica Kuhn should be excused from work until 7/27/21 pending re-evaluation. If you have any questions or concerns, please don't hesitate to call.     Sincerely,        ARTURO Garay CNP

## 2021-09-04 ENCOUNTER — PATIENT MESSAGE (OUTPATIENT)
Dept: FAMILY MEDICINE CLINIC | Age: 46
End: 2021-09-04

## 2021-09-04 DIAGNOSIS — H61.23 CERUMINOSIS, BILATERAL: Primary | ICD-10-CM

## 2021-10-07 ENCOUNTER — PATIENT MESSAGE (OUTPATIENT)
Dept: FAMILY MEDICINE CLINIC | Age: 46
End: 2021-10-07

## 2021-10-07 DIAGNOSIS — R42 CHRONIC VERTIGO: Primary | ICD-10-CM

## 2021-10-08 RX ORDER — MECLIZINE HYDROCHLORIDE 25 MG/1
25 TABLET ORAL 3 TIMES DAILY PRN
Qty: 60 TABLET | Refills: 2 | Status: SHIPPED | OUTPATIENT
Start: 2021-10-08 | End: 2021-11-16

## 2021-10-08 NOTE — TELEPHONE ENCOUNTER
From: Yue Huff  To: Leonidas Deluna, APRN - CNP  Sent: 10/7/2021 4:35 PM EDT  Subject: Prescription Question    This message is being sent by Shelley Kennedy on behalf of Yue Huff. Hi, was wondering if I could get prescribed antivert for my vertigo. US AWARE

## 2021-11-16 ENCOUNTER — HOSPITAL ENCOUNTER (EMERGENCY)
Age: 46
Discharge: HOME | End: 2021-11-16
Payer: COMMERCIAL

## 2021-11-16 VITALS
HEART RATE: 66 BPM | TEMPERATURE: 98.6 F | RESPIRATION RATE: 16 BRPM | HEIGHT: 68 IN | WEIGHT: 265 LBS | OXYGEN SATURATION: 96 % | DIASTOLIC BLOOD PRESSURE: 90 MMHG | BODY MASS INDEX: 40.16 KG/M2 | SYSTOLIC BLOOD PRESSURE: 148 MMHG

## 2021-11-16 DIAGNOSIS — J20.9 ACUTE BRONCHITIS, UNSPECIFIED ORGANISM: Primary | ICD-10-CM

## 2021-11-16 LAB — SARS-COV-2, NAA: NOT  DETECTED

## 2021-11-16 PROCEDURE — 99214 OFFICE O/P EST MOD 30 MIN: CPT | Performed by: NURSE PRACTITIONER

## 2021-11-16 PROCEDURE — 87635 SARS-COV-2 COVID-19 AMP PRB: CPT

## 2021-11-16 PROCEDURE — 99213 OFFICE O/P EST LOW 20 MIN: CPT

## 2021-11-16 RX ORDER — DOXYCYCLINE HYCLATE 100 MG
100 TABLET ORAL 2 TIMES DAILY
Qty: 20 TABLET | Refills: 0 | Status: SHIPPED | OUTPATIENT
Start: 2021-11-16 | End: 2021-11-26

## 2021-11-16 RX ORDER — ALBUTEROL SULFATE 90 UG/1
2 AEROSOL, METERED RESPIRATORY (INHALATION) EVERY 4 HOURS PRN
Qty: 1 EACH | Refills: 0 | Status: SHIPPED | OUTPATIENT
Start: 2021-11-16 | End: 2021-11-23

## 2021-11-16 RX ORDER — PREDNISONE 20 MG/1
40 TABLET ORAL DAILY
Qty: 10 TABLET | Refills: 0 | Status: SHIPPED | OUTPATIENT
Start: 2021-11-16 | End: 2021-11-21

## 2021-11-16 ASSESSMENT — ENCOUNTER SYMPTOMS
VOMITING: 0
CONSTIPATION: 0
NAUSEA: 0
COUGH: 1
BACK PAIN: 0
EYE DISCHARGE: 0
SORE THROAT: 0
EYE PAIN: 0
SHORTNESS OF BREATH: 0
ABDOMINAL PAIN: 0
RHINORRHEA: 1
ALLERGIC/IMMUNOLOGIC NEGATIVE: 1
EYE REDNESS: 0
TROUBLE SWALLOWING: 0
WHEEZING: 0
DIARRHEA: 0

## 2021-11-16 NOTE — ED PROVIDER NOTES
[facility]  Urgent Care Encounter      CHIEF COMPLAINT       Chief Complaint   Patient presents with    Chest Congestion    Nasal Congestion    Fatigue       Nurses Notes reviewed and I agree except as noted in the HPI. HISTORY OF PRESENT ILLNESS   Rachell Dove is a 55 y.o. male who presents with 3 days of cough, chest congestion, nasal congestion, fatigue. Patient denies anosmia, headache, diarrhea, shortness of breath, wheezing, fever or chills. Patient is a non-smoker with no history of asthma. REVIEW OF SYSTEMS     Review of Systems   Constitutional: Positive for fatigue. Negative for activity change and fever. HENT: Positive for congestion and rhinorrhea. Negative for ear pain, sore throat and trouble swallowing. Eyes: Negative for pain, discharge and redness. Respiratory: Positive for cough. Negative for shortness of breath and wheezing. Cardiovascular: Negative. Gastrointestinal: Negative for abdominal pain, constipation, diarrhea, nausea and vomiting. Endocrine: Negative. Genitourinary: Negative for dysuria, frequency and urgency. Musculoskeletal: Negative for arthralgias, back pain and myalgias. Skin: Negative for rash. Allergic/Immunologic: Negative. Neurological: Negative for dizziness, tremors, weakness and headaches. Hematological: Negative. Psychiatric/Behavioral: Negative for dysphoric mood and sleep disturbance. The patient is not nervous/anxious. PAST MEDICAL HISTORY         Diagnosis Date    History of Bell's palsy     Major depression     Obesity (BMI 30-39. 9)     Vertigo Started age 22    Recurrent vertigo       SURGICAL HISTORY     Patient  has no past surgical history on file. CURRENT MEDICATIONS       Previous Medications    No medications on file       ALLERGIES     Patient is is allergic to ibuprofen. FAMILY HISTORY     Patient'sfamily history includes COPD in his mother.     SOCIAL HISTORY     Patient reports that he has never smoked. He has never used smokeless tobacco. He reports that he does not drink alcohol and does not use drugs. PHYSICAL EXAM     ED TRIAGE VITALS  BP: (!) 148/90, Temp: 98.6 °F (37 °C), Pulse: 66, Resp: 16, SpO2: 96 %  Physical Exam  Vitals reviewed. Constitutional:       General: He is not in acute distress. Appearance: Normal appearance. He is well-developed. He is ill-appearing. He is not toxic-appearing or diaphoretic. HENT:      Head: Normocephalic. No right periorbital erythema or left periorbital erythema. Jaw: No trismus. Right Ear: Hearing, tympanic membrane, ear canal and external ear normal.      Left Ear: Hearing, tympanic membrane, ear canal and external ear normal.      Nose: Congestion and rhinorrhea present. No mucosal edema. Mouth/Throat:      Mouth: Mucous membranes are moist.      Dentition: Normal dentition. Pharynx: Uvula midline. No posterior oropharyngeal erythema. Tonsils: No tonsillar exudate. 0 on the right. 0 on the left. Eyes:      General: Lids are normal.         Right eye: No discharge. Left eye: No discharge. Conjunctiva/sclera: Conjunctivae normal.      Right eye: Right conjunctiva is not injected. No chemosis. Left eye: No chemosis. Pupils: Pupils are equal, round, and reactive to light. Neck:      Vascular: No JVD. Trachea: Trachea normal.   Cardiovascular:      Rate and Rhythm: Normal rate and regular rhythm. Heart sounds: Normal heart sounds. No murmur heard. Pulmonary:      Effort: Pulmonary effort is normal. No respiratory distress. Breath sounds: No stridor. Examination of the right-upper field reveals wheezing. Examination of the left-upper field reveals wheezing. Examination of the right-middle field reveals wheezing. Examination of the left-middle field reveals wheezing. Examination of the right-lower field reveals wheezing.  Examination of the left-lower field reveals wheezing. Wheezing present. No rhonchi or rales. Abdominal:      General: Bowel sounds are normal. There is no distension. Palpations: Abdomen is soft. Tenderness: There is no abdominal tenderness. Musculoskeletal:         General: No tenderness or signs of injury. Normal range of motion. Cervical back: Full passive range of motion without pain and normal range of motion. Lymphadenopathy:      Cervical: No cervical adenopathy. Skin:     General: Skin is warm and dry. Capillary Refill: Capillary refill takes less than 2 seconds. Findings: No rash. Neurological:      Mental Status: He is alert and oriented to person, place, and time. GCS: GCS eye subscore is 4. GCS verbal subscore is 5. GCS motor subscore is 6. Cranial Nerves: No cranial nerve deficit. Coordination: Coordination normal.      Gait: Gait normal.   Psychiatric:         Mood and Affect: Mood is not anxious or depressed. Speech: Speech normal.         Behavior: Behavior normal. Behavior is not withdrawn or hyperactive. Behavior is cooperative. Thought Content: Thought content normal.         Judgment: Judgment normal.         DIAGNOSTIC RESULTS   Labs:   Results for orders placed or performed during the hospital encounter of 11/16/21   COVID-19, Rapid   Result Value Ref Range    SARS-CoV-2, RASHIDA NOT  DETECTED NOT DETECTED       IMAGING:    URGENT CARE COURSE:     Vitals:    11/16/21 1809   BP: (!) 148/90   Pulse: 66   Resp: 16   Temp: 98.6 °F (37 °C)   SpO2: 96%   Weight: 265 lb (120.2 kg)   Height: 5' 8\" (1.727 m)       Medications - No data to display  PROCEDURES:  None  FINAL IMPRESSION      1. Acute bronchitis, unspecified organism        DISPOSITION/PLAN   DISPOSITION      Rapid Covid test negative.   Patient treated for acute bronchitis to follow with his PCP as needed    PATIENT REFERRED TO:  [address]  [address] [address] [phone]      If symptoms worsen    DISCHARGE MEDICATIONS:  New Prescriptions    ALBUTEROL SULFATE  (90 BASE) MCG/ACT INHALER    Inhale 2 puffs into the lungs every 4 hours as needed for Wheezing    DOXYCYCLINE HYCLATE (VIBRA-TABS) 100 MG TABLET    Take 1 tablet by mouth 2 times daily for 10 days    PREDNISONE (DELTASONE) 20 MG TABLET    Take 2 tablets by mouth daily for 5 days     Current Discharge Medication List          ARTURO Pires CNP, APRN - CNP  11/16/21 2280

## 2021-11-16 NOTE — ED NOTES
Pt discharged. Pt verbalized understanding of discharge instructions and scripts. Pt walked out per self in stable condition.      Samantha Cassidy, EDWARD  74/45/20 7731

## 2023-01-03 NOTE — PROGRESS NOTES
Chief Complaint   Patient presents with    Annual Exam     Well Visit       History obtained from the patient.    SUBJECTIVE:  Sam Long is a 47 y.o. male that presents today for     -Prev Med: Vaccines reviewed. Reviewed if routine labs are due. Denies family history of breast, colon, prostate or ovarian cancer. Denies LUTS or bowl habit changes. Denies fevers, chills, night sweats or wt loss.     Diet - ok  Exercise - not the best  Sleep - ok    -Poly joint pain:  Going on the last month or 2  Comes and goes  Tends to move around, but most often in hands, wrists and knees.   Painful and stiff on and off.   No fam hx of RA    Needs ear wax removed R ear. Can't do irrigation d/t vertigo. Would like to see ENT    Age/Gender Health Maintenance    Lipid - No results found for: CHOL  No results found for: TRIG  No results found for: HDL  No results found for: LDLCHOLESTEROL, LDLCALC  No results found for: LABVLDL, VLDL  No results found for: CHOLHDLRATIO    DM Screen -   Lab Results   Component Value Date/Time    GLUCOSE 130 09/12/2019 10:08 AM       Colon Cancer Screening - cologuard ordered JAN 2023  Lung Cancer Screening - Never smoker    Tetanus - declines JAN 2023  Influenza Vaccine - declines JAN 2023  Pneumonia Vaccine - age 65  Zoster - age 50     PSA testing discussion - age 55  AAA Screening - age 65 if smoked    Falls screening - n/a      No current outpatient medications on file.     No current facility-administered medications for this visit.     No orders of the defined types were placed in this encounter.      All medications reviewed and reconciled, including OTC and herbal medications. Updated list given to patient.       Patient Active Problem List    Diagnosis Date Noted    Obesity (BMI 30-39.9)     Anosmia 03/30/2020    Lymphadenopathy, submental 03/30/2020    History of Bell's palsy     Vertigo      Recurrent vertigo      History of depression        Past Medical History:   Diagnosis Date  History of Bell's palsy     History of depression     Obesity (BMI 30-39. 9)     Vertigo Started age 22    Recurrent vertigo       History reviewed. No pertinent surgical history. Allergies   Allergen Reactions    Ibuprofen Anaphylaxis       Social History     Tobacco Use    Smoking status: Never    Smokeless tobacco: Never   Substance Use Topics    Alcohol use: No       Family History   Problem Relation Age of Onset    COPD Mother     Colon Cancer Neg Hx     Prostate Cancer Neg Hx        I have reviewed the patient's past medical history, past surgical history, allergies, medications, social and family history and I have made updates where appropriate.       Review of Systems  Positive responses are highlighted in bold    Constitutional:  Fever, Chills, Night Sweats, Fatigue, Unexpected changes in weight  Eyes:  Eye discharge, Eye pain, Eye redness, Visual disturbances   HENT:  Ear pain, Tinnitus, Nosebleeds, Trouble swallowing, Hearing loss, Sore throat  Cardiovascular:  Chest Pain, Palpitations, Orthopnea, Paroxysmal Nocturnal Dyspnea  Respiratory:  Cough, Wheezing, Shortness of breath, Chest tightness, Apnea  Gastrointestinal:  Nausea, Vomiting, Diarrhea, Constipation, Heartburn, Blood in stool  Genitourinary:  Difficulty or painful urination, Flank pain, Change in frequency, Urgency  Skin:  Color change, Rash, Itching, Wound  Psychiatric:  Hallucinations, Anxiety, Depression, Suicidal ideation  Hematological:  Enlarged glands, Easy bleeding, Easily bruising  Musculoskeletal:  Joint pain, Back pain, Gait problems, Joint swelling, Myalgias  Neurological:  Dizziness, Headaches, Presyncope, Numbness, Seizures, Tremors  Allergy:  Environmental allergies, Food allergies  Endocrine:  Heat Intolerance, Cold Intolerance, Polydipsia, Polyphagia, Polyuria      PHYSICAL EXAM:  Vitals:    01/04/23 1455   BP: 130/76   Site: Right Upper Arm   Position: Sitting   Cuff Size: Large Adult   Pulse: 78   Resp: 16   Temp: 98.3 °F (36.8 °C)   SpO2: 97%   Weight: 267 lb (121.1 kg)   Height: 5' 8\" (1.727 m)     Body mass index is 40.6 kg/m². VS Reviewed  General Appearance: A&O x 3, No acute distress,well developed and well- nourished  Head: normocephalic and atraumatic  Eyes: pupils equal, round, and reactive to light, extraocular eye movements intact, conjunctivae and eye lids without erythema  ENT: external ear and ear canal clear on L but blocked on the R, TM intact and regular on L but can't see R, nose without deformity, nasal mucosa and turbinates normal without polyps, oropharynx normal, dentition is normal for age  Neck: supple and non-tender without mass, no thyromegaly or thyroid nodules, no cervical lymphadenopathy  Pulmonary/Chest: clear to auscultation bilaterally- no wheezes, rales or rhonchi, normal air movement, no respiratory distress or retractions  Cardiovascular: S1 and S2 auscultated w/ RRR. No murmurs, rubs, clicks, or gallops, distal pulses intact. Abdomen: soft, non-tender, non-distended, bowel sounds physiologic,  no rebound or guarding, no masses or hernias noted. Liver and spleen without enlargement. Extremities: no cyanosis, clubbing or edema of the lower extremities. +2 PT/DP bilaterally. Musculoskeletal: No joint swelling or gross deformity   Neuro:  Alert, 5/5 strength globally and symmetrically, 2+ patellar reflexes bilaterally  Psych: Affect appropriate. Mood normal. Thought process is normal without evidence of depression or psychosis. Good insight and appropriate interaction. Cognition and memory appear to be intact. Skin: warm and dry, no rash or erythema  Lymph:  No cervical, auricular or supraclavicular lymph nodes palpated      ASSESSMENT & PLAN  1.  Visit for preventive health examination    Vaccines reviewed and update recommendations made  Routine labs ordered  Cologuard ordered     For poly joint pain  Labs and xrays to r/o inflammatory arthritis ordered  ENT consult for ear wax removal, since can't do irrigation, placed    - CBC with Auto Differential; Future  - Comprehensive Metabolic Panel; Future  - Lipid Panel; Future    2. Screening for colon cancer    - Cologuard (Fecal DNA Colorectal Cancer Screening); Future      DISPOSITION    Return in about 4 weeks (around 2/1/2023) for f/u joint pain, sooner as needed. Rohan Fernando released without restrictions. Future Appointments   Date Time Provider Malena Juan   2/1/2023  9:00 AM [address]       PATIENT COUNSELING    Barriers to learning and self management: none    Discussed use, benefit, and side effects of prescribed medications. Barriers to medication compliance addressed. All patient questions answered. Pt voiced understanding.        Electronically signed by Gildardo Aguayo DO on 1/4/2023 at 3:32 PM

## 2023-01-04 ENCOUNTER — OFFICE VISIT (OUTPATIENT)
Dept: FAMILY MEDICINE CLINIC | Age: 48
End: 2023-01-04
Payer: COMMERCIAL

## 2023-01-04 VITALS
WEIGHT: 267 LBS | BODY MASS INDEX: 40.47 KG/M2 | TEMPERATURE: 98.3 F | DIASTOLIC BLOOD PRESSURE: 76 MMHG | OXYGEN SATURATION: 97 % | HEART RATE: 78 BPM | SYSTOLIC BLOOD PRESSURE: 130 MMHG | HEIGHT: 68 IN | RESPIRATION RATE: 16 BRPM

## 2023-01-04 DIAGNOSIS — H61.21 CERUMINOSIS, RIGHT: ICD-10-CM

## 2023-01-04 DIAGNOSIS — Z12.11 SCREENING FOR COLON CANCER: ICD-10-CM

## 2023-01-04 DIAGNOSIS — Z00.00 VISIT FOR PREVENTIVE HEALTH EXAMINATION: Primary | ICD-10-CM

## 2023-01-04 DIAGNOSIS — M25.50 POLYARTHRALGIA: Primary | ICD-10-CM

## 2023-01-04 DIAGNOSIS — R42 CHRONIC VERTIGO: ICD-10-CM

## 2023-01-04 PROCEDURE — 99396 PREV VISIT EST AGE 40-64: CPT | Performed by: FAMILY MEDICINE

## 2023-01-04 SDOH — ECONOMIC STABILITY: FOOD INSECURITY: WITHIN THE PAST 12 MONTHS, THE FOOD YOU BOUGHT JUST DIDN'T LAST AND YOU DIDN'T HAVE MONEY TO GET MORE.: NEVER TRUE

## 2023-01-04 SDOH — ECONOMIC STABILITY: FOOD INSECURITY: WITHIN THE PAST 12 MONTHS, YOU WORRIED THAT YOUR FOOD WOULD RUN OUT BEFORE YOU GOT MONEY TO BUY MORE.: NEVER TRUE

## 2023-01-04 ASSESSMENT — PATIENT HEALTH QUESTIONNAIRE - PHQ9
8. MOVING OR SPEAKING SO SLOWLY THAT OTHER PEOPLE COULD HAVE NOTICED. OR THE OPPOSITE, BEING SO FIGETY OR RESTLESS THAT YOU HAVE BEEN MOVING AROUND A LOT MORE THAN USUAL: 0
3. TROUBLE FALLING OR STAYING ASLEEP: 0
SUM OF ALL RESPONSES TO PHQ QUESTIONS 1-9: 0
SUM OF ALL RESPONSES TO PHQ QUESTIONS 1-9: 0
7. TROUBLE CONCENTRATING ON THINGS, SUCH AS READING THE NEWSPAPER OR WATCHING TELEVISION: 0
1. LITTLE INTEREST OR PLEASURE IN DOING THINGS: 0
9. THOUGHTS THAT YOU WOULD BE BETTER OFF DEAD, OR OF HURTING YOURSELF: 0
SUM OF ALL RESPONSES TO PHQ QUESTIONS 1-9: 0
SUM OF ALL RESPONSES TO PHQ QUESTIONS 1-9: 0
4. FEELING TIRED OR HAVING LITTLE ENERGY: 0
6. FEELING BAD ABOUT YOURSELF - OR THAT YOU ARE A FAILURE OR HAVE LET YOURSELF OR YOUR FAMILY DOWN: 0
10. IF YOU CHECKED OFF ANY PROBLEMS, HOW DIFFICULT HAVE THESE PROBLEMS MADE IT FOR YOU TO DO YOUR WORK, TAKE CARE OF THINGS AT HOME, OR GET ALONG WITH OTHER PEOPLE: 0
5. POOR APPETITE OR OVEREATING: 0
SUM OF ALL RESPONSES TO PHQ9 QUESTIONS 1 & 2: 0
2. FEELING DOWN, DEPRESSED OR HOPELESS: 0

## 2023-01-04 ASSESSMENT — SOCIAL DETERMINANTS OF HEALTH (SDOH): HOW HARD IS IT FOR YOU TO PAY FOR THE VERY BASICS LIKE FOOD, HOUSING, MEDICAL CARE, AND HEATING?: NOT HARD AT ALL

## 2023-01-04 NOTE — PATIENT INSTRUCTIONS
LAB INSTRUCTIONS:    Please complete labs within 4 week(s). Please fast for 8 hours prior to lab collection. The clinic will call you within 1 week of collection. If you have not heard from us within that amount of time, please call us at [phone].

## 2023-01-16 ENCOUNTER — HOSPITAL ENCOUNTER (OUTPATIENT)
Age: 48
Discharge: HOME | End: 2023-01-16
Payer: COMMERCIAL

## 2023-01-16 DIAGNOSIS — M25.50 POLYARTHRALGIA: ICD-10-CM

## 2023-01-16 DIAGNOSIS — Z00.00 VISIT FOR PREVENTIVE HEALTH EXAMINATION: ICD-10-CM

## 2023-01-16 LAB
ALBUMIN SERPL-MCNC: 4.2 G/DL (ref 3.5–5.1)
ALP BLD-CCNC: 79 U/L (ref 38–126)
ALT SERPL-CCNC: 43 U/L (ref 11–66)
ANION GAP SERPL CALCULATED.3IONS-SCNC: 9 MEQ/L (ref 8–16)
AST SERPL-CCNC: 26 U/L (ref 5–40)
BASOPHILS # BLD: 1.1 %
BASOPHILS ABSOLUTE: 0.1 THOU/MM3 (ref 0–0.1)
BILIRUB SERPL-MCNC: 0.2 MG/DL (ref 0.3–1.2)
BUN BLDV-MCNC: 13 MG/DL (ref 7–22)
C-REACTIVE PROTEIN: 0.56 MG/DL (ref 0–1)
CALCIUM SERPL-MCNC: 8.6 MG/DL (ref 8.5–10.5)
CHLORIDE BLD-SCNC: 103 MEQ/L (ref 98–111)
CHOLESTEROL, TOTAL: 214 MG/DL (ref 100–199)
CO2: 25 MEQ/L (ref 23–33)
CREAT SERPL-MCNC: 0.7 MG/DL (ref 0.4–1.2)
EOSINOPHIL # BLD: 5.3 %
EOSINOPHILS ABSOLUTE: 0.4 THOU/MM3 (ref 0–0.4)
ERYTHROCYTE [DISTWIDTH] IN BLOOD BY AUTOMATED COUNT: 13.1 % (ref 11.5–14.5)
ERYTHROCYTE [DISTWIDTH] IN BLOOD BY AUTOMATED COUNT: 42.3 FL (ref 35–45)
GFR SERPL CREATININE-BSD FRML MDRD: > 60 ML/MIN/1.73M2
GLUCOSE BLD-MCNC: 96 MG/DL (ref 70–108)
HCT VFR BLD CALC: 44.5 % (ref 42–52)
HDLC SERPL-MCNC: 39 MG/DL
HEMOGLOBIN: 14.7 GM/DL (ref 14–18)
IMMATURE GRANS (ABS): 0.01 THOU/MM3 (ref 0–0.07)
IMMATURE GRANULOCYTES: 0.1 %
LDL CHOLESTEROL CALCULATED: 155 MG/DL
LYMPHOCYTES # BLD: 33.3 %
LYMPHOCYTES ABSOLUTE: 2.6 THOU/MM3 (ref 1–4.8)
MCH RBC QN AUTO: 29.2 PG (ref 26–33)
MCHC RBC AUTO-ENTMCNC: 33 GM/DL (ref 32.2–35.5)
MCV RBC AUTO: 88.5 FL (ref 80–94)
MONOCYTES # BLD: 7.1 %
MONOCYTES ABSOLUTE: 0.6 THOU/MM3 (ref 0.4–1.3)
NUCLEATED RED BLOOD CELLS: 0 /100 WBC
PLATELET # BLD: 323 THOU/MM3 (ref 130–400)
PMV BLD AUTO: 9.8 FL (ref 9.4–12.4)
POTASSIUM SERPL-SCNC: 5 MEQ/L (ref 3.5–5.2)
RBC # BLD: 5.03 MILL/MM3 (ref 4.7–6.1)
RHEUMATOID FACTOR: 13 IU/ML (ref 0–13)
SEDIMENTATION RATE, ERYTHROCYTE: 13 MM/HR (ref 0–10)
SEG NEUTROPHILS: 53.1 %
SEGMENTED NEUTROPHILS ABSOLUTE COUNT: 4.2 THOU/MM3 (ref 1.8–7.7)
SODIUM BLD-SCNC: 137 MEQ/L (ref 135–145)
TOTAL PROTEIN: 7.2 G/DL (ref 6.1–8)
TRIGL SERPL-MCNC: 101 MG/DL (ref 0–199)
URIC ACID: 4 MG/DL (ref 3.7–7)
WBC # BLD: 7.9 THOU/MM3 (ref 4.8–10.8)

## 2023-01-16 PROCEDURE — 85025 COMPLETE CBC W/AUTO DIFF WBC: CPT

## 2023-01-16 PROCEDURE — 86140 C-REACTIVE PROTEIN: CPT

## 2023-01-16 PROCEDURE — 84550 ASSAY OF BLOOD/URIC ACID: CPT

## 2023-01-16 PROCEDURE — 36415 COLL VENOUS BLD VENIPUNCTURE: CPT

## 2023-01-16 PROCEDURE — 85651 RBC SED RATE NONAUTOMATED: CPT

## 2023-01-16 PROCEDURE — 86038 ANTINUCLEAR ANTIBODIES: CPT

## 2023-01-16 PROCEDURE — 86200 CCP ANTIBODY: CPT

## 2023-01-16 PROCEDURE — 80061 LIPID PANEL: CPT

## 2023-01-16 PROCEDURE — 86430 RHEUMATOID FACTOR TEST QUAL: CPT

## 2023-01-16 PROCEDURE — 80053 COMPREHEN METABOLIC PANEL: CPT

## 2023-01-17 ENCOUNTER — TELEPHONE (OUTPATIENT)
Dept: FAMILY MEDICINE CLINIC | Age: 48
End: 2023-01-17

## 2023-01-17 NOTE — TELEPHONE ENCOUNTER
----- Message from Chavez Osorio, DO sent at 1/16/2023  7:07 PM EST -----  Please let pt know that not all labs are back yet  What is back so far is ok other than cholesterol is a bit high, not high enough to warrant meds, will discuss further at f/u  Will call with rest of labs once results are available.   Let me know if questions, thanks!

## 2023-01-18 LAB — ANA SCREEN: DETECTED

## 2023-01-19 DIAGNOSIS — Z78.9 RHEUMATOID FACTOR NEGATIVE: ICD-10-CM

## 2023-01-19 DIAGNOSIS — M25.50 POLYARTHRALGIA: Primary | ICD-10-CM

## 2023-01-19 DIAGNOSIS — R76.8 POSITIVE ANA (ANTINUCLEAR ANTIBODY): ICD-10-CM

## 2023-01-19 DIAGNOSIS — R76.8 CYCLIC CITRULLINATED PEPTIDE (CCP) ANTIBODY POSITIVE: ICD-10-CM

## 2023-01-19 LAB
ANA PAT SER IF-IMP: ABNORMAL
CYCLIC CITRULLINATED PEPTIDE ANTIBODY IGG: 128 U/ML (ref 0–7)
NUCLEAR IGG SER QL IF: DETECTED
NUCLEAR IGG TITR SER IF: ABNORMAL {TITER}

## 2023-01-20 ENCOUNTER — TELEPHONE (OUTPATIENT)
Dept: FAMILY MEDICINE CLINIC | Age: 48
End: 2023-01-20

## 2023-01-20 NOTE — TELEPHONE ENCOUNTER
I'm sorry he had a bad experience. But it is important we get those done  Let me know if questions, thanks!

## 2023-01-20 NOTE — TELEPHONE ENCOUNTER
----- Message from Karl Lombard, DO sent at 1/19/2023  8:11 PM EST -----  Please let pt know that all labs are back now.   -findings are concerning for possible rheumatoid arthritis. Need to get xrays done as well, has order. Recommend we get him setup with rheumatology, consult has been placed. For further w/u. Does need to have xrays done soon for this consult. Let me know if questions, thanks!

## 2023-01-20 NOTE — TELEPHONE ENCOUNTER
Patients wife has been informed and voiced understanding and state sthat the patient went to get the XR done yesterday but the lady was rude so he left but wife states that she will have him go get them done after work today

## 2023-01-25 ENCOUNTER — HOSPITAL ENCOUNTER (OUTPATIENT)
Dept: GENERAL RADIOLOGY | Age: 48
Discharge: HOME | End: 2023-01-25
Payer: COMMERCIAL

## 2023-01-25 ENCOUNTER — HOSPITAL ENCOUNTER (OUTPATIENT)
Age: 48
Discharge: HOME | End: 2023-01-25
Payer: COMMERCIAL

## 2023-01-25 DIAGNOSIS — M25.50 POLYARTHRALGIA: ICD-10-CM

## 2023-01-25 PROCEDURE — 73110 X-RAY EXAM OF WRIST: CPT

## 2023-01-25 PROCEDURE — 73564 X-RAY EXAM KNEE 4 OR MORE: CPT

## 2023-01-25 PROCEDURE — 73130 X-RAY EXAM OF HAND: CPT

## 2023-01-26 ENCOUNTER — TELEPHONE (OUTPATIENT)
Dept: FAMILY MEDICINE CLINIC | Age: 48
End: 2023-01-26

## 2023-01-26 NOTE — TELEPHONE ENCOUNTER
----- Message from Jane Barnard DO sent at 1/26/2023  6:22 AM EST -----  Please let pt know that xrays show some mild arthritic changes in R wrist and both knees. No inflammatory changes or erosions, which is great  Con't with plan to see rheumatology. Let me know if questions, thanks!

## 2023-01-31 NOTE — PROGRESS NOTES
Chief Complaint   Patient presents with    Follow-up     Issues noted below       History obtained from the patient. SUBJECTIVE:  Karla Garcia is a 52 y.o. male that presents today for     -Poly joint pain LAST VISIT:  Going on the last month or 2  Comes and goes  Tends to move around, but most often in hands, wrists and knees. Painful and stiff on and off.    No fam hx of RA    UPDATE TODAY:   Here for f/u on above  Joint pain worse today  L had the worse and wrist  Hard to move  Some days a better than others  Also with some numbness  Labs c/w RA  Xray ok  Has consult with rheum, no apt setup yet  Pt hasn't heard from them, he states      -Obesity:  Working on diet changes/exercise      -HLD:  Noted on labs  Low cohort score  Is working on diet changes      -L biceps tear:  Injured 18+ months ago  Never treated  Declines intervention today, but wanted me to be aware      Age/Gender Health Maintenance    Lipid -   Lab Results   Component Value Date    CHOL 214 (H) 01/16/2023     Lab Results   Component Value Date    TRIG 101 01/16/2023     Lab Results   Component Value Date    HDL 39 01/16/2023     Lab Results   Component Value Date    LDLCALC 155 01/16/2023     No results found for: LABVLDL, VLDL  No results found for: CHOLHDLRATIO      DM Screen -   Lab Results   Component Value Date/Time    GLUCOSE 96 01/16/2023 01:50 PM       Colon Cancer Screening - cologuard ordered JAN 2023  Lung Cancer Screening - Never smoker    Tetanus - declines JAN 2023  Influenza Vaccine - declines JAN 2023  Pneumonia Vaccine - age 72  Zoster - age 48     PSA testing discussion - age 54  AAA Screening - age 72 if smoked    Falls screening - n/a      Current Outpatient Medications   Medication Sig Dispense Refill    predniSONE (DELTASONE) 10 MG tablet 4 tabs PO ONCE Daily x 4 days, then 2 tabs PO ONCE daily x 4 days, then 1 TAB PO ONCE daily x 4 days, then 1/2 TAB PO ONCE daily x 4 days 30 tablet 0     No current facility-administered medications for this visit. Orders Placed This Encounter   Medications    predniSONE (DELTASONE) 10 MG tablet     Si tabs PO ONCE Daily x 4 days, then 2 tabs PO ONCE daily x 4 days, then 1 TAB PO ONCE daily x 4 days, then 1/2 TAB PO ONCE daily x 4 days     Dispense:  30 tablet     Refill:  0         All medications reviewed and reconciled, including OTC and herbal medications. Updated list given to patient. Patient Active Problem List    Diagnosis Date Noted    Dyslipidemia      Priority: Medium    Obesity (BMI 30-39. 9)     Anosmia 2020    Lymphadenopathy, submental 2020    History of Bell's palsy     Vertigo      Recurrent vertigo      History of depression        Past Medical History:   Diagnosis Date    Dyslipidemia     History of Bell's palsy     History of depression     Obesity (BMI 30-39. 9)     Vertigo Started age 22    Recurrent vertigo       History reviewed. No pertinent surgical history. Allergies   Allergen Reactions    Ibuprofen Anaphylaxis       Social History     Tobacco Use    Smoking status: Never    Smokeless tobacco: Never   Substance Use Topics    Alcohol use: No       Family History   Problem Relation Age of Onset    COPD Mother     Colon Cancer Neg Hx     Prostate Cancer Neg Hx        I have reviewed the patient's past medical history, past surgical history, allergies, medications, social and family history and I have made updates where appropriate.       Review of Systems  Positive responses are highlighted in bold    Constitutional:  Fever, Chills, Night Sweats, Fatigue, Unexpected changes in weight  HENT:  Ear pain, Tinnitus, Nosebleeds, Trouble swallowing, Hearing loss, Sore throat  Cardiovascular:  Chest Pain, Palpitations, Orthopnea, Paroxysmal Nocturnal Dyspnea  Respiratory:  Cough, Wheezing, Shortness of breath, Chest tightness, Apnea  Gastrointestinal:  Nausea, Vomiting, Diarrhea, Constipation, Heartburn, Blood in stool  Genitourinary:  Difficulty or painful urination, Flank pain, Change in frequency, Urgency  Skin:  Color change, Rash, Itching, Wound  Musculoskeletal:  Joint pain, Back pain, Gait problems, Joint swelling, Myalgias  Neurological:  Dizziness, Headaches, Presyncope, Numbness, Seizures, Tremors  Endocrine:  Heat Intolerance, Cold Intolerance, Polydipsia, Polyphagia, Polyuria      PHYSICAL EXAM:  Vitals:    02/01/23 0900   BP: 130/78   Pulse: 87   Resp: 16   Temp: 97.9 °F (36.6 °C)   SpO2: 98%   Weight: 269 lb 3.2 oz (122.1 kg)   Height: 5' 8\" (1.727 m)     Body mass index is 40.93 kg/m². VS Reviewed  General Appearance: A&O x 3, No acute distress,well developed and well- nourished  Eyes: pupils equal, round, and reactive to light, extraocular eye movements intact, conjunctivae and eye lids without erythema  ENT: external ear and ear canal clear bilaterally, TMs intact and regular, nose without deformity, nasal mucosa and turbinates normal without polyps, oropharynx normal, dentition is normal for age  Neck: supple and non-tender without mass, no thyromegaly or thyroid nodules, no cervical lymphadenopathy  Pulmonary/Chest: clear to auscultation bilaterally- no wheezes, rales or rhonchi, normal air movement, no respiratory distress or retractions  Cardiovascular: S1 and S2 auscultated w/ RRR. No murmurs, rubs, clicks, or gallops, distal pulses intact. Abdomen: soft, non-tender, non-distended, bowel sounds physiologic,  no rebound or guarding, no masses or hernias noted. Liver and spleen without enlargement. Extremities: no cyanosis, clubbing or edema of the lower extremities.    Skin: warm and dry, no rash or erythema      Hospital Outpatient Visit on 01/16/2023   Component Date Value Ref Range Status    ARMANDO SCREEN 01/16/2023 Detected (A)  None Detected Final    CRP 01/16/2023 0.56  0.00 - 1.00 mg/dl Final    CC Peptide,IgG Ab 01/16/2023 128.0 (A)  0.0 - 7.0 U/mL Final    Rheumatoid Factor 01/16/2023 13  0 - 13 IU/mL Final    Sed Rate 01/16/2023 13 (A)  0 - 10 mm/hr Final    Uric Acid 01/16/2023 4.0  3.7 - 7.0 mg/dL Final    WBC 01/16/2023 7.9  4.8 - 10.8 thou/mm3 Final    RBC 01/16/2023 5.03  4.70 - 6.10 mill/mm3 Final    Hemoglobin 01/16/2023 14.7  14.0 - 18.0 gm/dl Final    Hematocrit 01/16/2023 44.5  42.0 - 52.0 % Final    MCV 01/16/2023 88.5  80.0 - 94.0 fL Final    MCH 01/16/2023 29.2  26.0 - 33.0 pg Final    MCHC 01/16/2023 33.0  32.2 - 35.5 gm/dl Final    RDW-CV 01/16/2023 13.1  11.5 - 14.5 % Final    RDW-SD 01/16/2023 42.3  35.0 - 45.0 fL Final    Platelets 61/85/1351 323  130 - 400 thou/mm3 Final    MPV 01/16/2023 9.8  9.4 - 12.4 fL Final    Seg Neutrophils 01/16/2023 53.1  % Final    Lymphocytes 01/16/2023 33.3  % Final    Monocytes 01/16/2023 7.1  % Final    Eosinophils 01/16/2023 5.3  % Final    Basophils 01/16/2023 1.1  % Final    Immature Granulocytes 01/16/2023 0.1  % Final    Segs Absolute 01/16/2023 4.2  1.8 - 7.7 thou/mm3 Final    Lymphocytes Absolute 01/16/2023 2.6  1.0 - 4.8 thou/mm3 Final    Monocytes Absolute 01/16/2023 0.6  0.4 - 1.3 thou/mm3 Final    Eosinophils Absolute 01/16/2023 0.4  0.0 - 0.4 thou/mm3 Final    Basophils Absolute 01/16/2023 0.1  0.0 - 0.1 thou/mm3 Final    Immature Grans (Abs) 01/16/2023 0.01  0.00 - 0.07 thou/mm3 Final    nRBC 01/16/2023 0  /100 wbc Final    Glucose 01/16/2023 96  70 - 108 mg/dL Final    Creatinine 01/16/2023 0.7  0.4 - 1.2 mg/dL Final    BUN 01/16/2023 13  7 - 22 mg/dL Final    Sodium 01/16/2023 137  135 - 145 meq/L Final    Potassium 01/16/2023 5.0  3.5 - 5.2 meq/L Final    Chloride 01/16/2023 103  98 - 111 meq/L Final    CO2 01/16/2023 25  23 - 33 meq/L Final    Calcium 01/16/2023 8.6  8.5 - 10.5 mg/dL Final    AST 01/16/2023 26  5 - 40 U/L Final    Alkaline Phosphatase 01/16/2023 79  38 - 126 U/L Final    Total Protein 01/16/2023 7.2  6.1 - 8.0 g/dL Final    Albumin 01/16/2023 4.2  3.5 - 5.1 g/dL Final    Total Bilirubin 01/16/2023 0.2 (A)  0.3 - 1.2 mg/dL Final    ALT 01/16/2023 43  11 - 66 U/L Final    Cholesterol, Total 01/16/2023 214 (A)  100 - 199 mg/dL Final    Triglycerides 01/16/2023 101  0 - 199 mg/dL Final    HDL 01/16/2023 39  mg/dL Final    LDL Calculated 01/16/2023 155  mg/dL Final    Anion Gap 01/16/2023 9.0  8.0 - 16.0 meq/L Final    Est, Glom Filt Rate 01/16/2023 >60  >60 ml/min/1.73m2 Final    Antinuclear Antibody, Hep-2, IGG 01/16/2023 Detected (A)  <1:80 Final    ARMANDO Pattern 01/19/2023 Homogeneous (A)   Final    ARMANDO Titer 01/19/2023 1:640 (A)   Final       Narrative   PROCEDURE: XR KNEE RIGHT (MIN 4 VIEWS), XR KNEE LEFT (MIN 4 VIEWS)       CLINICAL INFORMATION: Polyarthralgia       COMPARISON: No prior study. TECHNIQUE: 4 views of both knees were obtained. FINDINGS: No fracture or dislocation is seen. There is no foreign body. There is mild narrowing of the lateral facet of both patellofemoral joint compartment and mild lateral subluxation of both patella relative to the respective distal femurs. Mild    degenerative spurring is seen involving the left patellofemoral joint. As also mild spurring along the lateral aspect of intercondylar notch left knee and minimal spurring along the lateral aspect of the proximal tibia. Impression   Very mild degenerative changes both knees               **This report has been created using voice recognition software. It may contain minor errors which are inherent in voice recognition technology. **               Final report electronically signed by Dr. Ghazal Son on 1/25/2023 4:59 PM       Narrative   PROCEDURE: XR WRIST LEFT (MIN 3 VIEWS)       CLINICAL INFORMATION: Polyarthralgia       TECHNIQUE: 4 views of the left wrist       COMPARISON: None       FINDINGS: There is no fracture or dislocation. Minimal joint space narrowing sclerosis are present at the first carpal-metacarpal joint. A 2 mm lucent lesion at the distal scapula is likely a bone cyst. Soft tissues are unremarkable. Impression       No fracture or dislocation. Final report electronically signed by Dr. Brenda Jimenez on 1/25/2023 5:26 PM       Narrative   XR WRIST RIGHT (MIN 3 VIEWS)       CLINICAL INFORMATION: Polyarthralgia       COMPARISON: No prior study. TECHNIQUE: Standard views of the wrist were obtained       FINDINGS: No acute fracture or dislocation is seen. . There is no foreign body. .. There is no evidence for bone destruction or bone erosion. There appear to be mild degenerative changes involve the navicular/multangular joints. There also mild    degenerative changes involving the first metacarpal/phalangeal joint. Impression   Mild degenerative changes, as described. Otherwise normal appearing wrist.               **This report has been created using voice recognition software. It may contain minor errors which are inherent in voice recognition technology. **       Final report electronically signed by Dr. Kandice Crane on 1/25/2023 4:57 PM     Narrative   PROCEDURE: XR HAND LEFT (MIN 3 VIEWS)       CLINICAL INFORMATION: Polyarthralgia       TECHNIQUE: 3 views of the right hand       COMPARISON: None       FINDINGS: There is no fracture or dislocation. Joint spaces are preserved. Well-defined lytic lesions measuring less than 2 mm at the distal scaphoid bone and at the distal first metacarpal bone are likely bone cysts. Soft tissues are unremarkable. Impression       No fracture or dislocation. Final report electronically signed by Dr. Brenda Jimenez on 1/25/2023 5:33 PM       Narrative   PROCEDURE: XR HAND RIGHT (MIN 3 VIEWS)       CLINICAL INFORMATION: Polyarthralgia       TECHNIQUE: 3 views of the right hand       COMPARISON: None       FINDINGS: There is no fracture or dislocation. Joint spaces are preserved. No soft tissue or osseous abnormalities are identified. Impression       No fracture or dislocation.        Final report electronically signed by  Tam Galindo on 1/25/2023 5:32 PM       ASSESSMENT & PLAN  1. Polyarthralgia    Likely with RA  Because of inc pain, stiffness, will start pred taper in short term  Will reach out to rheum office today to see how soon can be seen  If will be a while, will get on DMARD in the mean time. If can see sooner, will taper steroids and defer definitive treatment to rheum    Off work until Monday, 2/6  EMG for possible concomitant CTS    - predniSONE (DELTASONE) 10 MG tablet; 4 tabs PO ONCE Daily x 4 days, then 2 tabs PO ONCE daily x 4 days, then 1 TAB PO ONCE daily x 4 days, then 1/2 TAB PO ONCE daily x 4 days  Dispense: 30 tablet; Refill: 0    2. Positive ARMANDO (antinuclear antibody)    - predniSONE (DELTASONE) 10 MG tablet; 4 tabs PO ONCE Daily x 4 days, then 2 tabs PO ONCE daily x 4 days, then 1 TAB PO ONCE daily x 4 days, then 1/2 TAB PO ONCE daily x 4 days  Dispense: 30 tablet; Refill: 0    3. Cyclic citrullinated peptide (CCP) antibody positive    - predniSONE (DELTASONE) 10 MG tablet; 4 tabs PO ONCE Daily x 4 days, then 2 tabs PO ONCE daily x 4 days, then 1 TAB PO ONCE daily x 4 days, then 1/2 TAB PO ONCE daily x 4 days  Dispense: 30 tablet; Refill: 0    4. Rheumatoid factor negative    - predniSONE (DELTASONE) 10 MG tablet; 4 tabs PO ONCE Daily x 4 days, then 2 tabs PO ONCE daily x 4 days, then 1 TAB PO ONCE daily x 4 days, then 1/2 TAB PO ONCE daily x 4 days  Dispense: 30 tablet; Refill: 0    5. Numbness of left hand    - [facility] ([facility]) - Geo Sandoval MD    6. Obesity (BMI 30-39. 9)    Discussed wt loss strategies    7. Dyslipidemia    Con't TLC  Monitor labs  Low cohort score    8. Tear of left biceps muscle, sequela    Injured 18+ months ago  Declines intervention right now  Will need PT/OT and ortho consult      DISPOSITION    Return in about 4 weeks (around 3/1/2023) for f/u joint pain/RA, sooner as needed. Byron Humphrey released without restrictions.     Future Appointments   Date Time Provider Malena Espanai   3/1/2023  8:20 AM [address]   3/23/2023  1:30 PM Mariah Vaughn MD [facility] - [facility]     PATIENT COUNSELING    Barriers to learning and self management: none    Discussed use, benefit, and side effects of prescribed medications. Barriers to medication compliance addressed. All patient questions answered. Pt voiced understanding.        Electronically signed by Polo Garcia DO on 2/1/2023 at 9:35 AM

## 2023-02-01 ENCOUNTER — OFFICE VISIT (OUTPATIENT)
Dept: FAMILY MEDICINE CLINIC | Age: 48
End: 2023-02-01
Payer: COMMERCIAL

## 2023-02-01 VITALS
HEIGHT: 68 IN | OXYGEN SATURATION: 98 % | HEART RATE: 87 BPM | DIASTOLIC BLOOD PRESSURE: 78 MMHG | SYSTOLIC BLOOD PRESSURE: 130 MMHG | TEMPERATURE: 97.9 F | RESPIRATION RATE: 16 BRPM | BODY MASS INDEX: 40.8 KG/M2 | WEIGHT: 269.2 LBS

## 2023-02-01 DIAGNOSIS — E66.9 OBESITY (BMI 30-39.9): Chronic | ICD-10-CM

## 2023-02-01 DIAGNOSIS — R20.0 NUMBNESS OF LEFT HAND: ICD-10-CM

## 2023-02-01 DIAGNOSIS — R76.8 POSITIVE ANA (ANTINUCLEAR ANTIBODY): ICD-10-CM

## 2023-02-01 DIAGNOSIS — Z78.9 RHEUMATOID FACTOR NEGATIVE: ICD-10-CM

## 2023-02-01 DIAGNOSIS — S46.212S TEAR OF LEFT BICEPS MUSCLE, SEQUELA: ICD-10-CM

## 2023-02-01 DIAGNOSIS — M25.50 POLYARTHRALGIA: Primary | ICD-10-CM

## 2023-02-01 DIAGNOSIS — R76.8 CYCLIC CITRULLINATED PEPTIDE (CCP) ANTIBODY POSITIVE: ICD-10-CM

## 2023-02-01 DIAGNOSIS — E78.5 DYSLIPIDEMIA: Chronic | ICD-10-CM

## 2023-02-01 PROCEDURE — 99214 OFFICE O/P EST MOD 30 MIN: CPT | Performed by: FAMILY MEDICINE

## 2023-02-01 RX ORDER — PREDNISONE 10 MG/1
TABLET ORAL
Qty: 30 TABLET | Refills: 0 | Status: SHIPPED | OUTPATIENT
Start: 2023-02-01 | End: 2023-02-17

## 2023-02-01 SDOH — ECONOMIC STABILITY: HOUSING INSECURITY
IN THE LAST 12 MONTHS, WAS THERE A TIME WHEN YOU DID NOT HAVE A STEADY PLACE TO SLEEP OR SLEPT IN A SHELTER (INCLUDING NOW)?: NO

## 2023-02-01 SDOH — ECONOMIC STABILITY: INCOME INSECURITY: HOW HARD IS IT FOR YOU TO PAY FOR THE VERY BASICS LIKE FOOD, HOUSING, MEDICAL CARE, AND HEATING?: NOT VERY HARD

## 2023-02-01 SDOH — ECONOMIC STABILITY: FOOD INSECURITY: WITHIN THE PAST 12 MONTHS, THE FOOD YOU BOUGHT JUST DIDN'T LAST AND YOU DIDN'T HAVE MONEY TO GET MORE.: NEVER TRUE

## 2023-02-01 SDOH — ECONOMIC STABILITY: FOOD INSECURITY: WITHIN THE PAST 12 MONTHS, YOU WORRIED THAT YOUR FOOD WOULD RUN OUT BEFORE YOU GOT MONEY TO BUY MORE.: NEVER TRUE

## 2023-02-02 ENCOUNTER — TELEPHONE (OUTPATIENT)
Dept: FAMILY MEDICINE CLINIC | Age: 48
End: 2023-02-02

## 2023-02-02 NOTE — TELEPHONE ENCOUNTER
----- Message from Keara Westfall DO sent at 2/2/2023  7:26 AM EST -----  Pt was referred to rheumatology: Lydia Ferguson MD, Rheumatology, [facility]     (Order #: [id number]), on 1/19/2023. Pt states he hasn't heard from that office yet. Can you reach out to them an ensure they got the referral and help get pt scheduled. Can you let me know how soon they can see him? Labs are done, xrays done. Should be good to go. Let me know, thanks!

## 2023-02-13 ENCOUNTER — OFFICE VISIT (OUTPATIENT)
Dept: RHEUMATOLOGY | Age: 48
End: 2023-02-13
Payer: COMMERCIAL

## 2023-02-13 VITALS
HEIGHT: 68 IN | WEIGHT: 269 LBS | OXYGEN SATURATION: 99 % | DIASTOLIC BLOOD PRESSURE: 82 MMHG | SYSTOLIC BLOOD PRESSURE: 140 MMHG | HEART RATE: 68 BPM | BODY MASS INDEX: 40.77 KG/M2

## 2023-02-13 DIAGNOSIS — M05.9 SEROPOSITIVE RHEUMATOID ARTHRITIS (HCC): Primary | ICD-10-CM

## 2023-02-13 DIAGNOSIS — Z79.899 ENCOUNTER FOR LONG-TERM (CURRENT) USE OF HIGH-RISK MEDICATION: ICD-10-CM

## 2023-02-13 PROCEDURE — 99204 OFFICE O/P NEW MOD 45 MIN: CPT | Performed by: INTERNAL MEDICINE

## 2023-02-13 RX ORDER — FOLIC ACID 1 MG/1
1 TABLET ORAL DAILY
Qty: 30 TABLET | Refills: 1 | Status: SHIPPED | OUTPATIENT
Start: 2023-02-13 | End: 2023-03-15

## 2023-02-13 RX ORDER — PREDNISONE 1 MG/1
5 TABLET ORAL DAILY
Qty: 30 TABLET | Refills: 0 | Status: SHIPPED | OUTPATIENT
Start: 2023-02-13 | End: 2023-03-15

## 2023-02-13 ASSESSMENT — ENCOUNTER SYMPTOMS
BLOOD IN STOOL: 0
COUGH: 0
SHORTNESS OF BREATH: 0
NAUSEA: 0
VOMITING: 0
ABDOMINAL PAIN: 0

## 2023-02-13 NOTE — PROGRESS NOTES
[facility]) Physicians   Rheumatology Clinic Note      2/13/2023       Reason for Consult:  seropositive rheumatoid arthritis  Requesting Physician:  Siri Alvarado DO     CHIEF COMPLAINT:    Chief Complaint   Patient presents with    New Patient     New pt - positive jonny, polyarthralgia     Pt stated pain 2/10 feeling good better then before            HISTORY OF PRESENT ILLNESS:    52 y.o. male presents today for evaluation of rheumatoid arthritis. Reports developing pain and swelling in his joints several months. Pain started in the right shoulder. Then progressed to involve his hands, right wrist.  Associated with swelling. Has prolonged morning stiffness. Joint pain tend to migrate from one area to the other. Was recently prescribed prednisone, which eased his pain, swelling and stiffness significantly. Has not joint pain today. Strong family h/o RA. Past Medical History:     has a past medical history of Dyslipidemia, History of Bell's palsy, History of depression, Obesity (BMI 30-39.9), and Vertigo. Past Surgical History:     has no past surgical history on file. Current Medications:      Current Outpatient Medications:     folic acid (FOLVITE) 1 MG tablet, Take 1 tablet by mouth daily, Disp: 30 tablet, Rfl: 1    methotrexate (RHEUMATREX) 2.5 MG chemo tablet, Take 6 tablets by mouth once a week, Disp: 24 tablet, Rfl: 1    predniSONE (DELTASONE) 5 MG tablet, Take 1 tablet by mouth daily, Disp: 30 tablet, Rfl: 0    predniSONE (DELTASONE) 10 MG tablet, 4 tabs PO ONCE Daily x 4 days, then 2 tabs PO ONCE daily x 4 days, then 1 TAB PO ONCE daily x 4 days, then 1/2 TAB PO ONCE daily x 4 days, Disp: 30 tablet, Rfl: 0    Allergies:    Ibuprofen    Social History:     reports that he has never smoked. He has never used smokeless tobacco. He reports that he does not drink alcohol and does not use drugs. Family History:   family history includes COPD in his mother.       REVIEW OF SYSTEMS:    Review of Systems   Constitutional:  Negative for chills, fever and unexpected weight change. HENT:  Negative for mouth sores. Respiratory:  Negative for cough and shortness of breath. Cardiovascular:  Negative for chest pain and leg swelling. Gastrointestinal:  Negative for abdominal pain, blood in stool, nausea and vomiting. Skin:  Negative for rash. Neurological:  Negative for headaches. PHYSICAL EXAM:    Vitals:    BP (!) 140/82 (Site: Left Upper Arm, Position: Sitting, Cuff Size: Large Adult)   Pulse 68   Ht 5' 8\" (1.727 m)   Wt 269 lb (122 kg)   SpO2 99%   BMI 40.90 kg/m²     Physical Exam  Constitutional:       General: He is not in acute distress. Appearance: Normal appearance. HENT:      Mouth/Throat:      Mouth: Mucous membranes are moist.      Pharynx: Oropharynx is clear. Eyes:      Conjunctiva/sclera: Conjunctivae normal.   Cardiovascular:      Rate and Rhythm: Normal rate and regular rhythm. Heart sounds: Normal heart sounds. No murmur heard. No friction rub. Pulmonary:      Effort: Pulmonary effort is normal. No respiratory distress. Breath sounds: Normal breath sounds. No wheezing or rhonchi. Musculoskeletal:         General: No swelling, tenderness or deformity. Normal range of motion. Cervical back: Normal range of motion and neck supple. Right lower leg: No edema. Left lower leg: No edema. Lymphadenopathy:      Cervical: No cervical adenopathy. Skin:     General: Skin is warm and dry. Findings: No lesion or rash. Neurological:      General: No focal deficit present. Mental Status: He is alert and oriented to person, place, and time. Cranial Nerves: No cranial nerve deficit.       Gait: Gait normal.   Psychiatric:         Mood and Affect: Mood normal.          DATA:     Latest Reference Range & Units 1/16/23 13:50   Sodium 135 - 145 meq/L 137   Potassium 3.5 - 5.2 meq/L 5.0   Chloride 98 - 111 meq/L 103   CO2 23 - 33 meq/L 25   BUN,BUNPL 7 - 22 mg/dL 13   Creatinine 0.4 - 1.2 mg/dL 0.7   Anion Gap 8.0 - 16.0 meq/L 9.0   Est, Glom Filt Rate >60 ml/min/1.73m2 >60   Glucose, Random 70 - 108 mg/dL 96   CALCIUM, SERUM, 293652 8.5 - 10.5 mg/dL 8.6   Total Protein 6.1 - 8.0 g/dL 7.2   URIC ACID,URA 3.7 - 7.0 mg/dL 4.0      Latest Reference Range & Units 1/16/23 13:50   CRP 0.00 - 1.00 mg/dl 0.56      Latest Reference Range & Units 1/16/23 13:50   Albumin 3.5 - 5.1 g/dL 4.2   Alk Phos 38 - 126 U/L 79   ALT 11 - 66 U/L 43   AST 5 - 40 U/L 26   BILIRUBIN TOTAL 0.3 - 1.2 mg/dL 0.2 (L)   Total Protein 6.1 - 8.0 g/dL 7.2      Latest Reference Range & Units 1/16/23 13:52   WBC 4.8 - 10.8 thou/mm3 7.9   RBC 4.70 - 6.10 mill/mm3 5.03   Hemoglobin Quant 14.0 - 18.0 gm/dl 14.7   Hematocrit 42.0 - 52.0 % 44.5   MCV 80.0 - 94.0 fL 88.5   MCH 26.0 - 33.0 pg 29.2   MCHC 32.2 - 35.5 gm/dl 33.0   MPV 9.4 - 12.4 fL 9.8   RDW-CV 11.5 - 14.5 % 13.1   RDW-SD 35.0 - 45.0 fL 42.3   Platelet Count 916 - 400 thou/mm3 323      Latest Reference Range & Units 1/16/23 13:50   Sed Rate 0 - 10 mm/hr 13 (H)      Latest Reference Range & Units 1/16/23 13:50 1/19/23 19:59   ARMANDO PATTERN   Homogeneous ! ARMANDO SCREEN None Detected  Detected ! ARMANDO Titer   1:640 ! Rheumatoid Factor 0 - 13 IU/mL 13    Antinuclear Antibody, Hep-2, IGG <1:80  Detected (H)       Latest Reference Range & Units 1/16/23 13:50   Rheumatoid Factor 0 - 13 IU/mL 13      Latest Reference Range & Units 1/16/23 13:50   CC Peptide,IgG Ab 0.0 - 7.0 U/mL 128.0 (H)           IMPRESSION/RECOMMENDATIONS:      1. Seropositive rheumatoid arthritis (-RF, +CCP): no synovitis today, likely due to him being on prednsione 10 mg daily. Discussed with pt the likely diagnosis, natural history of this condition and outlined treatment plan. Patient expressed understanding.  -- start methotrexate 15 mg PO once weekly and folic acid 1 mg daily.  Discussed with pt the benefits, risks and adverse effects of this medication. Patient expressed understanding.     2. High risk med requiring close monitoring:  -- MTX: monitor CBC and CMP every 4-6 weeks    RTC in 6 weeks        Orders Placed This Encounter   Procedures    CBC with Auto Differential     Standing Status:   Future     Standing Expiration Date:   8/13/2023    Comprehensive Metabolic Panel     Standing Status:   Future     Standing Expiration Date:   2/13/2024    C-Reactive Protein     Standing Status:   Future     Standing Expiration Date:   2/13/2024    Sedimentation Rate     Standing Status:   Future     Standing Expiration Date:   2/13/2024        Megan Villavicencio MD    [address] [facility]  [address] [facility]. [address]  [phone]

## 2023-02-28 PROBLEM — M06.9 RHEUMATOID ARTHRITIS (HCC): Status: ACTIVE | Noted: 2023-02-28

## 2023-11-15 ENCOUNTER — TELEPHONE (OUTPATIENT)
Dept: FAMILY MEDICINE CLINIC | Age: 48
End: 2023-11-15

## 2023-11-15 DIAGNOSIS — M05.9 SEROPOSITIVE RHEUMATOID ARTHRITIS (HCC): Primary | ICD-10-CM

## 2023-11-15 RX ORDER — PREDNISONE 10 MG/1
TABLET ORAL
Qty: 30 TABLET | Refills: 0 | Status: SHIPPED | OUTPATIENT
Start: 2023-11-15 | End: 2023-12-01

## 2023-11-15 RX ORDER — PREDNISONE 5 MG/1
5 TABLET ORAL DAILY
Qty: 30 TABLET | Refills: 0 | Status: CANCELLED | OUTPATIENT
Start: 2023-11-15 | End: 2023-12-15

## 2023-11-15 NOTE — TELEPHONE ENCOUNTER
Got it  Rx sent  Still need apt with me? Diagnosis Orders   1.  Seropositive rheumatoid arthritis ([address])  predniSONE (DELTASONE) 10 MG tablet        Future Appointments   Date Time Provider [address]   11/30/2023 10:20 AM Gabriel Mariee DO [address] [facility]

## 2023-11-15 NOTE — TELEPHONE ENCOUNTER
Recent Visits  Date Type Provider Dept   02/01/23 Office Visit Zachary Syed DO [facility]   01/04/23 Office Visit Zachary Syed DO [facility]   Showing recent visits within past 540 days with a meds authorizing provider and meeting all other requirements  Future Appointments  Date Type Provider Dept   11/30/23 Appointment Zachary Syed DO [address]   Showing future appointments within next 150 days with a meds authorizing provider and meeting all other requirements    [address] [facility] Staff (supporting Zachary Syed DO) 36 minutes ago (10:30 AM)     Alicja Issa  i scheduled an appoitment for nov 30, may i please get some prednsone for the pain until then?   thank you.

## 2023-11-15 NOTE — TELEPHONE ENCOUNTER
Spoke to patients wife on hippa, she states he stopped going to Rheumatology  bc he thought he didn't need to take medication ( he doesn't like medication)  he now realizes he needs medication and they are going to schedule an appointment so he can start seeing them again.   He is asking for prednisone to help with the pain and inflammation until he can get back in with Rheumatology

## 2023-12-26 DIAGNOSIS — R76.8 POSITIVE ANA (ANTINUCLEAR ANTIBODY): ICD-10-CM

## 2023-12-26 DIAGNOSIS — M25.50 POLYARTHRALGIA: ICD-10-CM

## 2023-12-26 DIAGNOSIS — R76.8 CYCLIC CITRULLINATED PEPTIDE (CCP) ANTIBODY POSITIVE: ICD-10-CM

## 2023-12-26 DIAGNOSIS — Z78.9 RHEUMATOID FACTOR NEGATIVE: ICD-10-CM

## 2023-12-26 RX ORDER — PREDNISONE 10 MG/1
TABLET ORAL
Qty: 30 TABLET | Refills: 0 | Status: SHIPPED | OUTPATIENT
Start: 2023-12-26 | End: 2024-01-11

## 2024-01-24 ENCOUNTER — OFFICE VISIT (OUTPATIENT)
Dept: RHEUMATOLOGY | Age: 49
End: 2024-01-24
Payer: COMMERCIAL

## 2024-01-24 VITALS
WEIGHT: 286 LBS | HEIGHT: 68 IN | DIASTOLIC BLOOD PRESSURE: 100 MMHG | OXYGEN SATURATION: 94 % | BODY MASS INDEX: 43.35 KG/M2 | SYSTOLIC BLOOD PRESSURE: 154 MMHG | HEART RATE: 64 BPM

## 2024-01-24 DIAGNOSIS — M05.9 SEROPOSITIVE RHEUMATOID ARTHRITIS (HCC): Primary | ICD-10-CM

## 2024-01-24 PROCEDURE — 99213 OFFICE O/P EST LOW 20 MIN: CPT | Performed by: INTERNAL MEDICINE

## 2024-01-24 ASSESSMENT — ENCOUNTER SYMPTOMS
ABDOMINAL PAIN: 0
COUGH: 0
SHORTNESS OF BREATH: 0
NAUSEA: 0
VOMITING: 0

## 2024-01-24 NOTE — PROGRESS NOTES
[facility]   Rheumatology Clinic Note      1/24/2024       Reason for Consult:  seropositive rheumatoid arthritis  Requesting Physician:  No ref. provider found     CHIEF COMPLAINT:    Chief Complaint   Patient presents with    Follow-up     Pt last seen 2/13/23    Other     Pt did not take methotrexate and he stated he feels great. He is very active with his job. No pain.            HISTORY OF PRESENT ILLNESS:    Last seen Feb 2023.  48 y.o. male with seropositive rheumatoid arthritis (-RF, +CCP, +ARMANDO) presents for follow up. When seen last in February 2023, methotrexate was prescribed.    Reports that he never started the methotrexate.   Overall is doing well. Reports that he has changed his diet. He has limited processed food and is consuming more fruits, vegetables. Switched sodas to water. Reports that he does not have joint pain. His energy level is good.  No prolonged morning stiffness.  No joint swelling.    Strong family h/o RA.      Past Medical History:     has a past medical history of Dyslipidemia, History of Bell's palsy, History of depression, Obesity (BMI 30-39.9), Rheumatoid arthritis (HCC), and Vertigo.    Past Surgical History:     has no past surgical history on file.    Current Medications:    No current outpatient medications on file.    Allergies:    Ibuprofen    Social History:     reports that he has never smoked. He has never used smokeless tobacco. He reports that he does not drink alcohol and does not use drugs.    Family History:   family history includes COPD in his mother.      REVIEW OF SYSTEMS:    Review of Systems   Constitutional:  Negative for chills and fever.   HENT:  Negative for mouth sores.    Respiratory:  Negative for cough and shortness of breath.    Cardiovascular:  Negative for chest pain.   Gastrointestinal:  Negative for abdominal pain, nausea and vomiting.   Skin:  Negative for rash.          PHYSICAL EXAM:    Vitals:    BP (!) 154/100 (Site: Left Upper

## 2024-01-25 ENCOUNTER — PATIENT MESSAGE (OUTPATIENT)
Dept: RHEUMATOLOGY | Age: 49
End: 2024-01-25

## 2024-03-12 ENCOUNTER — OFFICE VISIT (OUTPATIENT)
Dept: RHEUMATOLOGY | Age: 49
End: 2024-03-12
Payer: COMMERCIAL

## 2024-03-12 VITALS
BODY MASS INDEX: 43.5 KG/M2 | HEIGHT: 68 IN | WEIGHT: 287 LBS | HEART RATE: 65 BPM | SYSTOLIC BLOOD PRESSURE: 162 MMHG | OXYGEN SATURATION: 96 % | DIASTOLIC BLOOD PRESSURE: 74 MMHG

## 2024-03-12 DIAGNOSIS — M05.9 SEROPOSITIVE RHEUMATOID ARTHRITIS (HCC): Primary | ICD-10-CM

## 2024-03-12 PROCEDURE — 99214 OFFICE O/P EST MOD 30 MIN: CPT | Performed by: INTERNAL MEDICINE

## 2024-03-12 RX ORDER — ACETAMINOPHEN 500 MG
500 TABLET ORAL EVERY 6 HOURS PRN
COMMUNITY

## 2024-03-12 RX ORDER — PREDNISONE 10 MG/1
TABLET ORAL
Qty: 18 TABLET | Refills: 0 | Status: SHIPPED | OUTPATIENT
Start: 2024-03-12 | End: 2024-03-21

## 2024-03-12 RX ORDER — HYDROXYCHLOROQUINE SULFATE 200 MG/1
200 TABLET, FILM COATED ORAL 2 TIMES DAILY
Qty: 60 TABLET | Refills: 2 | Status: SHIPPED | OUTPATIENT
Start: 2024-03-12 | End: 2024-06-10

## 2024-03-12 ASSESSMENT — ENCOUNTER SYMPTOMS
SHORTNESS OF BREATH: 0
ABDOMINAL PAIN: 0
NAUSEA: 0
COUGH: 0
VOMITING: 0

## 2024-03-12 NOTE — PROGRESS NOTES
9.8   RDW-CV 11.5 - 14.5 % 13.1   RDW-SD 35.0 - 45.0 fL 42.3   Platelet Count 130 - 400 thou/mm3 323      Latest Reference Range & Units 1/16/23 13:50   Sed Rate 0 - 10 mm/hr 13 (H)      Latest Reference Range & Units 1/16/23 13:50 1/19/23 19:59   ARMANDO PATTERN   Homogeneous !   ARMANDO SCREEN None Detected  Detected !    ARMANDO Titer   1:640 !   Rheumatoid Factor 0 - 13 IU/mL 13    Antinuclear Antibody, Hep-2, IGG <1:80  Detected (H)       Latest Reference Range & Units 1/16/23 13:50   Rheumatoid Factor 0 - 13 IU/mL 13      Latest Reference Range & Units 1/16/23 13:50   CC Peptide,IgG Ab 0.0 - 7.0 U/mL 128.0 (H)       RAPID3 Composite Score = MDHAQ (0-10) + Patient pain VAS (0-10): + Patient global assessment VAS (0-10):     3/12/24    RAPID 3: 1.3 + 3.5 + 1.5 = 6.3     Remission: <3, Low Disease: <6, Moderate: >=6 & <=12, High Disease: >12        IMPRESSION/RECOMMENDATIONS:      1. Seropositive rheumatoid arthritis (-RF, +CCP): has mild-to-moderate disease activity.  -- trial of hydroxychloroquine 200 mg bid. Discussed with pt the benefits, risks and adverse effects of this medication. Patient expressed understanding.    RTC 2 months      No orders of the defined types were placed in this encounter.       Maria L Muir MD    [facility] RHEUMATOLOG[facility]  [address] [id number] [phone]

## 2024-04-03 NOTE — PATIENT INSTRUCTIONS
LAB INSTRUCTIONS:    Please complete labs within 4 week(s).    Please fast for 8 hours prior to lab collection.    The clinic will call you within 1 week of collection. If you have not heard from us within that amount of time, please call us at [phone].

## 2024-04-03 NOTE — PROGRESS NOTES
(before meals and nightly)  Dispense: 120 tablet; Refill: 2    2. Excessive gas    As above    - dicyclomine (BENTYL) 20 MG tablet; Take 1 tablet by mouth 4 times daily (before meals and nightly)  Dispense: 120 tablet; Refill: 2    3. Seropositive rheumatoid arthritis (HCC)    Con't Plaquenil and rheum f/u    4. Obesity (BMI 30-39.9)    Wt loss strategies discussed    - CBC with Auto Differential; Future  - Comprehensive Metabolic Panel; Future  - Lipid Panel; Future  - TSH with Reflex; Future    5. Dyslipidemia    Con't diet control  Due for labs    - CBC with Auto Differential; Future  - Comprehensive Metabolic Panel; Future  - Lipid Panel; Future  - TSH with Reflex; Future    6. Screening for colon cancer    Discussed Colon cancer screening with patient today.  The benefits and risks of having testing performed were discussed with patient.  After a long and detailed discussion with the patient, the patient does not want to proceed with colon cancer screening at this time. Pt is aware of the risks of this decision, including earlier death and are accepting of this risk.       DISPOSITION    Return in about 1 year (around 4/4/2025) for routine well visit, sooner as needed.    Sam released without restrictions.    Future Appointments   Date Time Provider Department Center   5/15/2024 11:40 AM Maria L Muir MD [facility] - [place]   4/10/2025  9:40 AM Chavez Osorio DO [facility] - [place]     PATIENT COUNSELING    Barriers to learning and self management: none    Discussed use, benefit, and side effects of prescribed medications.  Barriers to medication compliance addressed.  All patient questions answered.  Pt voiced understanding.       Electronically signed by Chavez Osorio DO on 4/4/2024 at 10:06 AM

## 2024-04-04 ENCOUNTER — OFFICE VISIT (OUTPATIENT)
Dept: FAMILY MEDICINE CLINIC | Age: 49
End: 2024-04-04
Payer: COMMERCIAL

## 2024-04-04 VITALS
DIASTOLIC BLOOD PRESSURE: 84 MMHG | BODY MASS INDEX: 43.65 KG/M2 | HEART RATE: 82 BPM | RESPIRATION RATE: 18 BRPM | WEIGHT: 288 LBS | HEIGHT: 68 IN | OXYGEN SATURATION: 98 % | SYSTOLIC BLOOD PRESSURE: 134 MMHG | TEMPERATURE: 97.8 F

## 2024-04-04 DIAGNOSIS — K58.9 IRRITABLE BOWEL SYNDROME, UNSPECIFIED TYPE: Primary | ICD-10-CM

## 2024-04-04 DIAGNOSIS — R14.3 EXCESSIVE GAS: ICD-10-CM

## 2024-04-04 DIAGNOSIS — E66.9 OBESITY (BMI 30-39.9): Chronic | ICD-10-CM

## 2024-04-04 DIAGNOSIS — E78.5 DYSLIPIDEMIA: Chronic | ICD-10-CM

## 2024-04-04 DIAGNOSIS — M05.9 SEROPOSITIVE RHEUMATOID ARTHRITIS (HCC): ICD-10-CM

## 2024-04-04 DIAGNOSIS — Z12.11 SCREENING FOR COLON CANCER: ICD-10-CM

## 2024-04-04 PROCEDURE — 99214 OFFICE O/P EST MOD 30 MIN: CPT | Performed by: FAMILY MEDICINE

## 2024-04-04 RX ORDER — DICYCLOMINE HCL 20 MG
20 TABLET ORAL
Qty: 120 TABLET | Refills: 2 | Status: SHIPPED | OUTPATIENT
Start: 2024-04-04

## 2024-04-04 SDOH — ECONOMIC STABILITY: FOOD INSECURITY: WITHIN THE PAST 12 MONTHS, YOU WORRIED THAT YOUR FOOD WOULD RUN OUT BEFORE YOU GOT MONEY TO BUY MORE.: NEVER TRUE

## 2024-04-04 SDOH — ECONOMIC STABILITY: FOOD INSECURITY: WITHIN THE PAST 12 MONTHS, THE FOOD YOU BOUGHT JUST DIDN'T LAST AND YOU DIDN'T HAVE MONEY TO GET MORE.: NEVER TRUE

## 2024-04-04 SDOH — ECONOMIC STABILITY: INCOME INSECURITY: HOW HARD IS IT FOR YOU TO PAY FOR THE VERY BASICS LIKE FOOD, HOUSING, MEDICAL CARE, AND HEATING?: NOT HARD AT ALL

## 2024-04-04 ASSESSMENT — PATIENT HEALTH QUESTIONNAIRE - PHQ9
2. FEELING DOWN, DEPRESSED OR HOPELESS: NOT AT ALL
SUM OF ALL RESPONSES TO PHQ QUESTIONS 1-9: 0
1. LITTLE INTEREST OR PLEASURE IN DOING THINGS: NOT AT ALL
SUM OF ALL RESPONSES TO PHQ QUESTIONS 1-9: 0
SUM OF ALL RESPONSES TO PHQ9 QUESTIONS 1 & 2: 0
SUM OF ALL RESPONSES TO PHQ QUESTIONS 1-9: 0
SUM OF ALL RESPONSES TO PHQ QUESTIONS 1-9: 0

## 2024-05-01 ENCOUNTER — TELEPHONE (OUTPATIENT)
Dept: FAMILY MEDICINE CLINIC | Age: 49
End: 2024-05-01

## 2024-05-01 NOTE — TELEPHONE ENCOUNTER
----- Message from Chavez Osorio DO sent at 5/1/2024  5:05 AM EDT -----  Please call pt and see how his GI symptoms have been with the addition of Bentyl  Let me know, thanks!

## 2024-05-15 ENCOUNTER — OFFICE VISIT (OUTPATIENT)
Dept: RHEUMATOLOGY | Age: 49
End: 2024-05-15
Payer: COMMERCIAL

## 2024-05-15 VITALS
OXYGEN SATURATION: 97 % | BODY MASS INDEX: 44.22 KG/M2 | HEART RATE: 58 BPM | HEIGHT: 68 IN | SYSTOLIC BLOOD PRESSURE: 144 MMHG | DIASTOLIC BLOOD PRESSURE: 78 MMHG | WEIGHT: 291.8 LBS

## 2024-05-15 DIAGNOSIS — R13.10 DYSPHAGIA, UNSPECIFIED TYPE: ICD-10-CM

## 2024-05-15 DIAGNOSIS — M05.9 SEROPOSITIVE RHEUMATOID ARTHRITIS (HCC): Primary | ICD-10-CM

## 2024-05-15 PROCEDURE — 99214 OFFICE O/P EST MOD 30 MIN: CPT | Performed by: INTERNAL MEDICINE

## 2024-05-15 ASSESSMENT — ENCOUNTER SYMPTOMS
VOMITING: 0
COUGH: 0
NAUSEA: 0
SHORTNESS OF BREATH: 0

## 2024-05-15 NOTE — PROGRESS NOTES
[facility]   Rheumatology Clinic Note      5/15/2024       CHIEF COMPLAINT:    Chief Complaint   Patient presents with    Follow-up     2 month f/u Seropositive rheumatoid arthritis (HCC)    Other     Pt is tolerating well.           HISTORY OF PRESENT ILLNESS:    48 y.o. male with seropositive rheumatoid arthritis (-RF, +CCP, +ARMANDO) presents for for follow up. When seen last, hydroxychloroquine 200 mg bid was prescribed.    Reports improvement in his joint pain and stiffness since starting hydroxychloroquine.  Reports having no joint pain or flare ups since seen last.    Strong family h/o RA.    Reports having episodes of dysphagia to solid food.        Past Medical History:     has a past medical history of Dyslipidemia, History of Bell's palsy, History of depression, Obesity (BMI 30-39.9), Rheumatoid arthritis (HCC), and Vertigo.    Past Surgical History:     has no past surgical history on file.    Current Medications:      Current Outpatient Medications:     dicyclomine (BENTYL) 20 MG tablet, Take 1 tablet by mouth 4 times daily (before meals and nightly), Disp: 120 tablet, Rfl: 2    acetaminophen (TYLENOL) 500 MG tablet, Take 1 tablet by mouth every 6 hours as needed for Pain, Disp: , Rfl:     hydroxychloroquine (PLAQUENIL) 200 MG tablet, Take 1 tablet by mouth 2 times daily, Disp: 60 tablet, Rfl: 2    Allergies:    Ibuprofen    Social History:     reports that he has never smoked. He has never used smokeless tobacco. He reports that he does not drink alcohol and does not use drugs.    Family History:   family history includes COPD in his mother.      REVIEW OF SYSTEMS:    Review of Systems   Constitutional:  Negative for chills and fever.   HENT:  Negative for mouth sores.    Respiratory:  Negative for cough and shortness of breath.    Cardiovascular:  Negative for chest pain.   Gastrointestinal:  Negative for abdominal pain, nausea and vomiting.   Skin:  Negative for rash.          PHYSICAL EXAM:

## 2024-06-15 DIAGNOSIS — M05.9 SEROPOSITIVE RHEUMATOID ARTHRITIS (HCC): ICD-10-CM

## 2024-06-17 RX ORDER — HYDROXYCHLOROQUINE SULFATE 200 MG/1
200 TABLET, FILM COATED ORAL 2 TIMES DAILY
Qty: 60 TABLET | Refills: 5 | Status: SHIPPED | OUTPATIENT
Start: 2024-06-17

## 2024-06-27 ENCOUNTER — PATIENT MESSAGE (OUTPATIENT)
Dept: RHEUMATOLOGY | Age: 49
End: 2024-06-27

## 2024-06-27 RX ORDER — PREDNISONE 10 MG/1
TABLET ORAL
Qty: 18 TABLET | Refills: 0 | Status: SHIPPED | OUTPATIENT
Start: 2024-06-27 | End: 2024-07-06

## 2024-06-27 NOTE — TELEPHONE ENCOUNTER
From: Sam Long  To: Dr. Maria L Muir  Sent: 6/27/2024 2:27 PM EDT  Subject: sore    I have been taking my meds and tylonal arthritis, im super sore and exhausted. Is there any recomendations.

## 2024-07-30 RX ORDER — PREDNISONE 10 MG/1
TABLET ORAL
Qty: 18 TABLET | Refills: 0 | OUTPATIENT
Start: 2024-07-30 | End: 2024-08-07

## 2024-11-11 PROBLEM — R59.1 LYMPHADENOPATHY: Status: RESOLVED | Noted: 2020-03-30 | Resolved: 2024-11-11

## 2024-11-12 NOTE — PROGRESS NOTES
Chief Complaint   Patient presents with    Depression    Follow-up     Issues noted below     History obtained from the patient.    SUBJECTIVE:  Sam Long is a 49 y.o. male that presents today for     -Depressed Mood:    HPI:  Feeling more depressed and irritable the last year  +depressed mood  Sleep fine  +anhedonia  +feelings of guilt  +decreased energy    Mom with hx of depression  Never treated before    Depressed Mood? yes -   Anhedonia?  yes -   Appetite changes?  Not really  Sleep disturbances? no  Feelings of guilt? yes -   Decreased energy? yes -   Impaired concentration? yes -   Substance abuse? no    Suicidal/Homicidal Ideation? no        -RA:  Dx Spring 2023  Seeing rheum now  Sxs improving  On Plaquenil      -Obesity:  Working on diet changes/exercise      -HLD:  Noted on labs prior  Low cohort score  Is working on diet changes  Due for f/u labs. Has order.     The 10-year ASCVD risk score (Vito WHITAKER, et al., 2019) is: 4.9%      -Declines colon cancer screening today      Age/Gender Health Maintenance    Lipid -   The 10-year ASCVD risk score (Vito WHITAKER, et al., 2019) is: 4.9%  Lab Results   Component Value Date    CHOL 214 (H) 01/16/2023     Lab Results   Component Value Date    TRIG 101 01/16/2023     Lab Results   Component Value Date    HDL 39 01/16/2023     Lab Results   Component Value Date     01/16/2023       DM Screen -   Lab Results   Component Value Date/Time    GLUCOSE 96 01/16/2023 01:50 PM     No results found for: \"LABA1C\"      Colon Cancer Screening - Declines NOV 2024  Lung Cancer Screening - Never smoker    Tetanus - declines APR 2024  Influenza Vaccine - Declines FALL 2024  Pneumonia Vaccine - age 65  Zoster - age 50     PSA testing discussion - age 55  No results found for: \"PSA\"    AAA Screening - age 65 if smoked    Falls screening - n/a      Current Outpatient Medications   Medication Sig Dispense Refill    escitalopram (LEXAPRO) 5 MG tablet Take 1 tablet by mouth

## 2024-11-13 ENCOUNTER — OFFICE VISIT (OUTPATIENT)
Dept: FAMILY MEDICINE CLINIC | Age: 49
End: 2024-11-13

## 2024-11-13 VITALS
RESPIRATION RATE: 18 BRPM | OXYGEN SATURATION: 99 % | TEMPERATURE: 97.8 F | WEIGHT: 287.2 LBS | DIASTOLIC BLOOD PRESSURE: 82 MMHG | SYSTOLIC BLOOD PRESSURE: 134 MMHG | HEART RATE: 87 BPM | BODY MASS INDEX: 43.53 KG/M2 | HEIGHT: 68 IN

## 2024-11-13 DIAGNOSIS — E78.5 DYSLIPIDEMIA: Chronic | ICD-10-CM

## 2024-11-13 DIAGNOSIS — E66.9 OBESITY (BMI 30-39.9): Chronic | ICD-10-CM

## 2024-11-13 DIAGNOSIS — F32.1 CURRENT MODERATE EPISODE OF MAJOR DEPRESSIVE DISORDER WITHOUT PRIOR EPISODE (HCC): Primary | Chronic | ICD-10-CM

## 2024-11-13 DIAGNOSIS — M05.9 SEROPOSITIVE RHEUMATOID ARTHRITIS (HCC): ICD-10-CM

## 2024-11-13 DIAGNOSIS — Z12.11 SCREENING FOR COLON CANCER: ICD-10-CM

## 2024-11-13 RX ORDER — ESCITALOPRAM OXALATE 5 MG/1
5 TABLET ORAL DAILY
Qty: 30 TABLET | Refills: 2 | Status: SHIPPED | OUTPATIENT
Start: 2024-11-13

## 2025-02-09 DIAGNOSIS — M05.9 SEROPOSITIVE RHEUMATOID ARTHRITIS (HCC): ICD-10-CM

## 2025-02-10 RX ORDER — HYDROXYCHLOROQUINE SULFATE 200 MG/1
200 TABLET, FILM COATED ORAL 2 TIMES DAILY
Qty: 60 TABLET | Refills: 5 | OUTPATIENT
Start: 2025-02-10

## 2025-03-26 DIAGNOSIS — M05.9 SEROPOSITIVE RHEUMATOID ARTHRITIS (HCC): ICD-10-CM

## 2025-03-26 RX ORDER — HYDROXYCHLOROQUINE SULFATE 200 MG/1
200 TABLET, FILM COATED ORAL 2 TIMES DAILY
Qty: 60 TABLET | Refills: 1 | Status: SHIPPED | OUTPATIENT
Start: 2025-03-26

## 2025-03-26 NOTE — TELEPHONE ENCOUNTER
Sam is requesting a refill of their   Requested Prescriptions     Pending Prescriptions Disp Refills    hydroxychloroquine (PLAQUENIL) 200 MG tablet 60 tablet 5     Sig: Take 1 tablet by mouth 2 times daily   . Please advise.      Last Appt:  Visit date not found  Next Appt:   Visit date not found  Preferred pharmacy:   [id number] - [place], [place] - [address] - P [phone] - F [phone]     Patient states he is in a lot of pain.

## 2025-04-09 ENCOUNTER — OFFICE VISIT (OUTPATIENT)
Age: 50
End: 2025-04-09
Payer: COMMERCIAL

## 2025-04-09 VITALS
HEIGHT: 68 IN | SYSTOLIC BLOOD PRESSURE: 130 MMHG | HEART RATE: 103 BPM | BODY MASS INDEX: 39.89 KG/M2 | OXYGEN SATURATION: 94 % | WEIGHT: 263.2 LBS | DIASTOLIC BLOOD PRESSURE: 80 MMHG

## 2025-04-09 DIAGNOSIS — M05.9 SEROPOSITIVE RHEUMATOID ARTHRITIS (HCC): ICD-10-CM

## 2025-04-09 PROCEDURE — 99214 OFFICE O/P EST MOD 30 MIN: CPT | Performed by: INTERNAL MEDICINE

## 2025-04-09 RX ORDER — HYDROXYCHLOROQUINE SULFATE 200 MG/1
200 TABLET, FILM COATED ORAL 2 TIMES DAILY
Qty: 180 TABLET | Refills: 1 | Status: SHIPPED | OUTPATIENT
Start: 2025-04-09 | End: 2025-10-06

## 2025-04-09 ASSESSMENT — ENCOUNTER SYMPTOMS
COUGH: 0
SHORTNESS OF BREATH: 0
ABDOMINAL PAIN: 0
NAUSEA: 0
VOMITING: 0

## 2025-04-09 NOTE — PROGRESS NOTES
44.5   MCV 80.0 - 94.0 fL 88.5   MCH 26.0 - 33.0 pg 29.2   MCHC 32.2 - 35.5 gm/dl 33.0   MPV 9.4 - 12.4 fL 9.8   RDW-CV 11.5 - 14.5 % 13.1   RDW-SD 35.0 - 45.0 fL 42.3   Platelet Count 130 - 400 thou/mm3 323      Latest Reference Range & Units 1/16/23 13:50   Sed Rate 0 - 10 mm/hr 13 (H)      Latest Reference Range & Units 1/16/23 13:50 1/19/23 19:59   ARMANDO PATTERN   Homogeneous !   ARMANDO SCREEN None Detected  Detected !    ARMANDO Titer   1:640 !   Rheumatoid Factor 0 - 13 IU/mL 13    Antinuclear Antibody, Hep-2, IGG <1:80  Detected (H)       Latest Reference Range & Units 1/16/23 13:50   Rheumatoid Factor 0 - 13 IU/mL 13      Latest Reference Range & Units 1/16/23 13:50   CC Peptide,IgG Ab 0.0 - 7.0 U/mL 128.0 (H)       RAPID3 Composite Score = MDHAQ (0-10) + Patient pain VAS (0-10): + Patient global assessment VAS (0-10):     3/12/24    RAPID 3: 1.3 + 3.5 + 1.5 = 6.3    5/15/24  --- RAPID 3: 0 + 0 + 1 = 1    4/9/25  --- RAPID 3: 3 + 6.5 + 4 = 13.5     Remission: <3, Low Disease: <6, Moderate: >=6 & <=12, High Disease: >12        IMPRESSION/RECOMMENDATIONS:      1. Seropositive rheumatoid arthritis (-RF, +CCP): worsening RA features due to running out of hydroxychloroquine  -- restart hydroxychloroquine 200 mg bid      RTC 6 months      No orders of the defined types were placed in this encounter.       Maria L Muir MD    [facility] RHEUMATOLOG[facility]  [address] [id number] [phone]

## 2025-04-28 ENCOUNTER — PATIENT MESSAGE (OUTPATIENT)
Age: 50
End: 2025-04-28

## 2025-04-28 DIAGNOSIS — M05.9 SEROPOSITIVE RHEUMATOID ARTHRITIS (HCC): Primary | ICD-10-CM

## 2025-04-29 RX ORDER — PREDNISONE 10 MG/1
TABLET ORAL
Qty: 18 TABLET | Refills: 0 | Status: SHIPPED | OUTPATIENT
Start: 2025-04-29 | End: 2025-05-08

## 2025-05-16 ASSESSMENT — PATIENT HEALTH QUESTIONNAIRE - PHQ9
5. POOR APPETITE OR OVEREATING: NOT AT ALL
3. TROUBLE FALLING OR STAYING ASLEEP: SEVERAL DAYS
4. FEELING TIRED OR HAVING LITTLE ENERGY: SEVERAL DAYS
8. MOVING OR SPEAKING SO SLOWLY THAT OTHER PEOPLE COULD HAVE NOTICED. OR THE OPPOSITE - BEING SO FIDGETY OR RESTLESS THAT YOU HAVE BEEN MOVING AROUND A LOT MORE THAN USUAL: NOT AT ALL
5. POOR APPETITE OR OVEREATING: NOT AT ALL
3. TROUBLE FALLING OR STAYING ASLEEP: SEVERAL DAYS
4. FEELING TIRED OR HAVING LITTLE ENERGY: SEVERAL DAYS
SUM OF ALL RESPONSES TO PHQ QUESTIONS 1-9: 12
1. LITTLE INTEREST OR PLEASURE IN DOING THINGS: NEARLY EVERY DAY
2. FEELING DOWN, DEPRESSED OR HOPELESS: NEARLY EVERY DAY
SUM OF ALL RESPONSES TO PHQ QUESTIONS 1-9: 12
10. IF YOU CHECKED OFF ANY PROBLEMS, HOW DIFFICULT HAVE THESE PROBLEMS MADE IT FOR YOU TO DO YOUR WORK, TAKE CARE OF THINGS AT HOME, OR GET ALONG WITH OTHER PEOPLE: SOMEWHAT DIFFICULT
7. TROUBLE CONCENTRATING ON THINGS, SUCH AS READING THE NEWSPAPER OR WATCHING TELEVISION: MORE THAN HALF THE DAYS
2. FEELING DOWN, DEPRESSED OR HOPELESS: NEARLY EVERY DAY
9. THOUGHTS THAT YOU WOULD BE BETTER OFF DEAD, OR OF HURTING YOURSELF: NOT AT ALL
7. TROUBLE CONCENTRATING ON THINGS, SUCH AS READING THE NEWSPAPER OR WATCHING TELEVISION: MORE THAN HALF THE DAYS
8. MOVING OR SPEAKING SO SLOWLY THAT OTHER PEOPLE COULD HAVE NOTICED. OR THE OPPOSITE, BEING SO FIGETY OR RESTLESS THAT YOU HAVE BEEN MOVING AROUND A LOT MORE THAN USUAL: NOT AT ALL
SUM OF ALL RESPONSES TO PHQ QUESTIONS 1-9: 12
10. IF YOU CHECKED OFF ANY PROBLEMS, HOW DIFFICULT HAVE THESE PROBLEMS MADE IT FOR YOU TO DO YOUR WORK, TAKE CARE OF THINGS AT HOME, OR GET ALONG WITH OTHER PEOPLE: SOMEWHAT DIFFICULT
6. FEELING BAD ABOUT YOURSELF - OR THAT YOU ARE A FAILURE OR HAVE LET YOURSELF OR YOUR FAMILY DOWN: MORE THAN HALF THE DAYS
9. THOUGHTS THAT YOU WOULD BE BETTER OFF DEAD, OR OF HURTING YOURSELF: NOT AT ALL
1. LITTLE INTEREST OR PLEASURE IN DOING THINGS: NEARLY EVERY DAY
6. FEELING BAD ABOUT YOURSELF - OR THAT YOU ARE A FAILURE OR HAVE LET YOURSELF OR YOUR FAMILY DOWN: MORE THAN HALF THE DAYS
SUM OF ALL RESPONSES TO PHQ QUESTIONS 1-9: 12
SUM OF ALL RESPONSES TO PHQ QUESTIONS 1-9: 12

## 2025-05-18 NOTE — PROGRESS NOTES
for: \"LABA1C\"      Colon Cancer Screening - Declines MAY 2025  Lung Cancer Screening - Never smoker    Tetanus - declines MAY 2025  Influenza Vaccine - Declines FALL 2024  Pneumonia Vaccine - age 65  Zoster - age 50     PSA testing discussion - age 55  No results found for: \"PSA\"    AAA Screening - age 65 if smoked    Falls screening - n/a      Current Outpatient Medications   Medication Sig Dispense Refill    hydroxychloroquine (PLAQUENIL) 200 MG tablet Take 1 tablet by mouth 2 times daily 180 tablet 1    acetaminophen (TYLENOL) 500 MG tablet Take 1 tablet by mouth every 6 hours as needed for Pain       No current facility-administered medications for this visit.     No orders of the defined types were placed in this encounter.      All medications reviewed and reconciled, including OTC and herbal medications. Updated list given to patient.       Patient Active Problem List    Diagnosis Date Noted    Rheumatoid arthritis (HCC)     Dyslipidemia     Obesity (BMI 30-39.9)     Anosmia 03/30/2020    History of Bell's palsy     Vertigo      Recurrent vertigo      Major depression        Past Medical History:   Diagnosis Date    Dyslipidemia     History of Bell's palsy     Major depression     Obesity (BMI 30-39.9)     Rheumatoid arthritis (HCC)     Vertigo Started age 25    Recurrent vertigo       History reviewed. No pertinent surgical history.      Allergies   Allergen Reactions    Ibuprofen Anaphylaxis       Social History     Tobacco Use    Smoking status: Never    Smokeless tobacco: Never   Substance Use Topics    Alcohol use: No       Family History   Problem Relation Age of Onset    COPD Mother     Colon Cancer Neg Hx     Prostate Cancer Neg Hx        I have reviewed the patient's past medical history, past surgical history, allergies, medications, social and family history and I have made updates where appropriate.      Review of Systems  Positive responses are highlighted in bold    Constitutional:  Fever,

## 2025-05-19 ENCOUNTER — OFFICE VISIT (OUTPATIENT)
Dept: FAMILY MEDICINE CLINIC | Age: 50
End: 2025-05-19

## 2025-05-19 VITALS
HEIGHT: 68 IN | WEIGHT: 248 LBS | SYSTOLIC BLOOD PRESSURE: 132 MMHG | RESPIRATION RATE: 16 BRPM | BODY MASS INDEX: 37.59 KG/M2 | TEMPERATURE: 97.8 F | DIASTOLIC BLOOD PRESSURE: 82 MMHG | HEART RATE: 88 BPM | OXYGEN SATURATION: 98 %

## 2025-05-19 DIAGNOSIS — K42.9 PERIUMBILICAL HERNIA: Primary | ICD-10-CM

## 2025-05-19 DIAGNOSIS — M05.9 SEROPOSITIVE RHEUMATOID ARTHRITIS (HCC): ICD-10-CM

## 2025-05-19 DIAGNOSIS — E66.9 OBESITY (BMI 30-39.9): Chronic | ICD-10-CM

## 2025-05-19 DIAGNOSIS — E78.5 DYSLIPIDEMIA: Chronic | ICD-10-CM

## 2025-05-19 DIAGNOSIS — F32.5 MAJOR DEPRESSIVE DISORDER WITH SINGLE EPISODE, IN FULL REMISSION: ICD-10-CM

## 2025-05-19 DIAGNOSIS — Z12.11 SCREENING FOR COLON CANCER: ICD-10-CM

## 2025-05-19 PROCEDURE — 99214 OFFICE O/P EST MOD 30 MIN: CPT | Performed by: FAMILY MEDICINE

## 2025-05-19 SDOH — ECONOMIC STABILITY: FOOD INSECURITY: WITHIN THE PAST 12 MONTHS, YOU WORRIED THAT YOUR FOOD WOULD RUN OUT BEFORE YOU GOT MONEY TO BUY MORE.: NEVER TRUE

## 2025-05-19 SDOH — ECONOMIC STABILITY: FOOD INSECURITY: WITHIN THE PAST 12 MONTHS, THE FOOD YOU BOUGHT JUST DIDN'T LAST AND YOU DIDN'T HAVE MONEY TO GET MORE.: NEVER TRUE
